# Patient Record
Sex: FEMALE | Race: WHITE | NOT HISPANIC OR LATINO | Employment: STUDENT | URBAN - METROPOLITAN AREA
[De-identification: names, ages, dates, MRNs, and addresses within clinical notes are randomized per-mention and may not be internally consistent; named-entity substitution may affect disease eponyms.]

---

## 2021-11-01 ENCOUNTER — OFFICE VISIT (OUTPATIENT)
Dept: FAMILY MEDICINE CLINIC | Facility: CLINIC | Age: 21
End: 2021-11-01

## 2021-11-01 VITALS
WEIGHT: 189.8 LBS | DIASTOLIC BLOOD PRESSURE: 64 MMHG | SYSTOLIC BLOOD PRESSURE: 122 MMHG | TEMPERATURE: 98 F | BODY MASS INDEX: 34.93 KG/M2 | OXYGEN SATURATION: 99 % | HEART RATE: 92 BPM | HEIGHT: 62 IN

## 2021-11-01 DIAGNOSIS — Z00.00 ENCOUNTER FOR WELLNESS EXAMINATION IN ADULT: Primary | ICD-10-CM

## 2021-11-01 PROBLEM — L50.3 DERMATOGRAPHIA: Status: ACTIVE | Noted: 2021-11-01

## 2021-11-01 PROBLEM — D89.40 MAST CELL ACTIVATION SYNDROME (HCC): Status: ACTIVE | Noted: 2021-11-01

## 2021-11-01 PROCEDURE — 99385 PREV VISIT NEW AGE 18-39: CPT | Performed by: FAMILY MEDICINE

## 2021-11-01 NOTE — PROGRESS NOTES
Subjective   Bandar Jules is a 20 y.o. female who presents for annual female wellness exam.  Chief Complaint   Patient presents with   • Establish Care   • Annual Exam   • Gynecologic Exam        Menstrual History: LNMP 10/6/21, regular menses  Pregnancy History: G0  Sexual History: Never sexually active  Contraception: Abstinence  Diet: balanced  Exercise: no routine exercise  Do you feel safe? yes  Have you ever been abused? no    Mammogram: NA  Pap Smear: never, abstinence  Bone Density: NA  Colon Cancer Screening: NA      There is no immunization history on file for this patient.    The following portions of the patient's history were reviewed and updated as appropriate: allergies, current medications, past family history, past medical history, past social history, past surgical history and problem list.    History reviewed. No pertinent past medical history.    Past Surgical History:   Procedure Laterality Date   • EAR TUBES  2003       Family History   Problem Relation Age of Onset   • Basal cell carcinoma Father    • Breast cancer Maternal Grandmother    • Lung cancer Paternal Grandfather        Social History     Socioeconomic History   • Marital status: Single   • Number of children: 0   Tobacco Use   • Smoking status: Never Smoker   • Smokeless tobacco: Never Used   Vaping Use   • Vaping Use: Never used   Substance and Sexual Activity   • Alcohol use: Yes     Alcohol/week: 1.0 standard drink     Types: 1 Standard drinks or equivalent per week   • Drug use: Never   • Sexual activity: Never       Review of Systems   Constitutional: Negative for activity change, appetite change, fatigue and unexpected weight change.   HENT: Negative for congestion, ear pain, nosebleeds, sore throat and tinnitus.    Eyes: Negative for pain, redness and visual disturbance.   Respiratory: Negative for cough, shortness of breath and wheezing.    Cardiovascular: Negative for chest pain, palpitations and leg swelling.    Gastrointestinal: Negative for abdominal pain, blood in stool and nausea.   Endocrine: Negative for polydipsia and polyuria.   Genitourinary: Negative for dysuria, frequency, menstrual problem and vaginal discharge.   Musculoskeletal: Negative for arthralgias, joint swelling and myalgias.   Skin: Negative for rash.   Allergic/Immunologic: Negative for environmental allergies, food allergies and immunocompromised state.   Neurological: Negative for dizziness, speech difficulty, weakness and headaches.   Hematological: Negative for adenopathy. Does not bruise/bleed easily.   Psychiatric/Behavioral: Negative for decreased concentration and dysphoric mood. The patient is not nervous/anxious.        Objective   Vitals:    11/01/21 0848   BP: 122/64   Pulse: 92   Temp: 98 °F (36.7 °C)   SpO2: 99%     Body mass index is 34.71 kg/m².  Physical Exam  Vitals and nursing note reviewed.   Constitutional:       Appearance: Normal appearance. She is well-developed. She is obese.   HENT:      Head: Normocephalic and atraumatic.   Eyes:      General: No scleral icterus.     Conjunctiva/sclera: Conjunctivae normal.   Cardiovascular:      Rate and Rhythm: Normal rate and regular rhythm.      Heart sounds: Normal heart sounds.   Pulmonary:      Effort: Pulmonary effort is normal.      Breath sounds: Normal breath sounds.   Abdominal:      General: Bowel sounds are normal.      Palpations: Abdomen is soft.   Musculoskeletal:         General: Normal range of motion.      Cervical back: Normal range of motion and neck supple.      Right lower leg: No edema.      Left lower leg: No edema.   Skin:     General: Skin is warm and dry.      Capillary Refill: Capillary refill takes less than 2 seconds.      Findings: No rash.   Neurological:      Mental Status: She is alert and oriented to person, place, and time.   Psychiatric:         Mood and Affect: Mood normal.         Behavior: Behavior normal.         Thought Content: Thought content  normal.         Judgment: Judgment normal.           Assessment/Plan   Diagnoses and all orders for this visit:    1. Encounter for wellness examination in adult (Primary)    Since the patient is not sexually active at this time we can defer Pap smear today and plan on performing it next year.  Discussed the importance of maintaining a healthy weight and getting regular exercise.  Educated patient on the benefits of healthy diet.  Advise follow-up annually for wellness exams.      There are no Patient Instructions on file for this visit.

## 2021-11-05 ENCOUNTER — PATIENT ROUNDING (BHMG ONLY) (OUTPATIENT)
Dept: FAMILY MEDICINE CLINIC | Facility: CLINIC | Age: 21
End: 2021-11-05

## 2021-11-05 NOTE — PROGRESS NOTES
November 5, 2021    Hello, may I speak with Bandar Jules?    My name is PRINCE BAUTISTA    I am  with [facility]  [address]  [facility] [address] [phone].    Before we get started may I verify your date of birth? 2000    I am calling to officially welcome you to our practice and ask about your recent visit. Is this a good time to talk? yes    Tell me about your visit with us. What things went well?  ALL WAS GOOD       We're always looking for ways to make our patients' experiences even better. Do you have recommendations on ways we may improve?  no    Overall were you satisfied with your first visit to our practice? yes       I appreciate you taking the time to speak with me today. Is there anything else I can do for you? no      Thank you, and have a great day.

## 2022-11-28 ENCOUNTER — OFFICE VISIT (OUTPATIENT)
Dept: OBSTETRICS AND GYNECOLOGY | Facility: CLINIC | Age: 22
End: 2022-11-28

## 2022-11-28 VITALS
DIASTOLIC BLOOD PRESSURE: 70 MMHG | BODY MASS INDEX: 35.92 KG/M2 | WEIGHT: 195.2 LBS | SYSTOLIC BLOOD PRESSURE: 112 MMHG | HEIGHT: 62 IN

## 2022-11-28 DIAGNOSIS — N90.89 SKIN TAG OF LABIA: Primary | ICD-10-CM

## 2022-11-28 DIAGNOSIS — Z80.3 FAMILY HISTORY OF BREAST CANCER: ICD-10-CM

## 2022-11-28 PROCEDURE — 11200 RMVL SKIN TAGS UP TO&INC 15: CPT | Performed by: OBSTETRICS & GYNECOLOGY

## 2022-11-28 PROCEDURE — 99202 OFFICE O/P NEW SF 15 MIN: CPT | Performed by: OBSTETRICS & GYNECOLOGY

## 2022-11-28 RX ORDER — ISOTRETINOIN 20 MG/1
CAPSULE ORAL
COMMUNITY
Start: 2022-06-20

## 2022-11-28 RX ORDER — LORATADINE 10 MG/1
10 TABLET ORAL DAILY
COMMUNITY

## 2022-11-28 NOTE — PROGRESS NOTES
Chief Complaint   Patient presents with   • Establish Care   • Skin Problem     Right vulvar skin tags          Subjective   HPI  Bandar Jules is a 21 y.o. female, No obstetric history on file., who presents for evaluation of right vulvar skin tags that is recurrent.      She states she has experienced this problem for 4-5 years. She first discovered the right vulvar skin tags 4-5 years ago. Per patient the pathology was benign. Since hen she reports multiple right vulvar skin tags have returned about 1-2 years ago being when she first noticed them. She denies any pain or irration associated with them.  The patient reports additional symptoms as none.  The patient does not have a history of a bartholin's cyst.     Patient's last menstrual period was 11/18/2022 (exact date)..  Partner Status: Marital Status: single and has never been sexually active.    She has significant family history of breast and ovarian cancer and I recommend genetics referral.     Additional OB/GYN History   Last Pap : never  Last Completed Pap Smear     This patient has no relevant Health Maintenance data.          Tobacco Usage?: No       Current Outpatient Medications:   •  fluticasone (FLONASE) 50 MCG/ACT nasal spray, into the nostril(s) as directed by provider., Disp: , Rfl:   •  ISOtretinoin (ACCUTANE) 20 MG capsule, , Disp: , Rfl:   •  loratadine (Allergy) 10 MG tablet, Take 10 mg by mouth Daily., Disp: , Rfl:   •  Cetirizine HCl 10 MG capsule, Take  by mouth., Disp: , Rfl:      Past Medical History:   Diagnosis Date   • Mast cell activation syndrome (HCC)    • Sensitive skin         Past Surgical History:   Procedure Laterality Date   • EAR TUBES  2003   • WISDOM TOOTH EXTRACTION  2016?       The additional following portions of the patient's history were reviewed and updated as appropriate: allergies, current medications, past family history, past medical history, past social history, past surgical history and problem  "list.    Review of Systems   Constitutional: Negative.    Respiratory: Negative.    Cardiovascular: Negative.    Gastrointestinal: Negative.    Genitourinary: Negative for breast discharge, breast lump, breast pain, menstrual problem and pelvic pain.   Musculoskeletal: Negative.    Neurological: Negative.    Psychiatric/Behavioral: Negative.        I have reviewed and agree with the HPI, ROS, and historical information as entered above. Armand Navarrete MD    Objective   /70   Ht 157.5 cm (62\")   Wt 88.5 kg (195 lb 3.2 oz)   LMP 11/18/2022 (Exact Date)   BMI 35.70 kg/m²     Physical Exam  Vitals reviewed. Exam conducted with a chaperone present.   Constitutional:       Appearance: Normal appearance. She is normal weight.   HENT:      Head: Normocephalic and atraumatic.   Genitourinary:         Comments: Chain of multiple skin tags on the right labia majora  Skin:     General: Skin is warm and dry.   Neurological:      Mental Status: She is alert and oriented to person, place, and time.   Psychiatric:         Mood and Affect: Mood normal.         Behavior: Behavior normal.          Vulvar Biopsy Procedure Note           Indications:The patient is a 21 y.o. No obstetric history on file., who presents today for vulvar biopsy due to multiple skin tags on the right labia.   After being presented with the risk, benefits, and specific detail of the procedure, the patient wished to proceed.  Verbal consent was obtained from patient.       Procedure Details   The patient was placed in the dorsal lithotomy position and the area was prepped with betadine.   The area was injected with 1 mL of lidocaine with epinephrine using a 25 gauge needle.  After adequate anesthesia was reached, the skin tags were elevated with pick ups and skip biopsy performed.    The specimens were placed in formalin and sent to pathology for evaluation.  Pressure was applied to the biopsy site to control bleeding and silver nitrate was " applied.  Hemostasis was adequate.       There was minimal bleeding.  The patient tolerated the procedure well.     Complications: none.        Armand Navarrete MD  11/28/2022      Assessment & Plan     Assessment         Problem List Items Addressed This Visit        Genitourinary and Reproductive     Skin tag of labia - Primary    Relevant Medications    ISOtretinoin (ACCUTANE) 20 MG capsule    Other Relevant Orders    TISSUE EXAM, [facility] ([facility])   Other Visit Diagnoses     Family history of breast cancer        Relevant Orders    Ambulatory Referral to Genetic Counseling/Testing            Plan     1. Vulvar lesions removed without complications. Instructions given.   2. Will refer to genetics team for family history of breast and ovarian cancer.       Armand Navarrete MD  11/28/2022

## 2022-11-29 LAB — REF LAB TEST METHOD: NORMAL

## 2022-12-02 ENCOUNTER — PATIENT ROUNDING (BHMG ONLY) (OUTPATIENT)
Dept: OBSTETRICS AND GYNECOLOGY | Facility: CLINIC | Age: 22
End: 2022-12-02

## 2022-12-02 NOTE — PROGRESS NOTES
December 2, 2022    Hello, may I speak with Bandar Jules?    My name is Mariah    I am  with [facility]  [facility]  [address]  [place] [address] [phone].    Before we get started may I verify your date of birth? 2000    I am calling to officially welcome you to our practice and ask about your recent visit. Is this a good time to talk? No - left voicemail     Tell me about your visit with us. What things went well?  na     We're always looking for ways to make our patients' experiences even better. Do you have recommendations on ways we may improve?  na    Overall were you satisfied with your first visit to our practice? Na     I appreciate you taking the time to speak with me today. Is there anything else I can do for you? na      Thank you, and have a great day.

## 2022-12-02 NOTE — PROGRESS NOTES
December 2, 2022    Hello, may I speak with Bandar Jules?    My name is Mariah    I am  with [facility]  [facility]  [address]  [place] [address] [phone].    Before we get started may I verify your date of birth? 2000    I am calling to officially welcome you to our practice and ask about your recent visit. Is this a good time to talk? No - left voicemail     Tell me about your visit with us. What things went well?  na     We're always looking for ways to make our patients' experiences even better. Do you have recommendations on ways we may improve?  na    Overall were you satisfied with your first visit to our practice? na     I appreciate you taking the time to speak with me today. Is there anything else I can do for you? na    Thank you, and have a great day.

## 2023-08-21 ENCOUNTER — OFFICE VISIT (OUTPATIENT)
Dept: FAMILY MEDICINE CLINIC | Facility: CLINIC | Age: 23
End: 2023-08-21
Payer: COMMERCIAL

## 2023-08-21 ENCOUNTER — CLINICAL SUPPORT (OUTPATIENT)
Dept: GENETICS | Facility: HOSPITAL | Age: 23
End: 2023-08-21
Payer: COMMERCIAL

## 2023-08-21 VITALS
OXYGEN SATURATION: 100 % | WEIGHT: 198.6 LBS | DIASTOLIC BLOOD PRESSURE: 78 MMHG | HEART RATE: 64 BPM | SYSTOLIC BLOOD PRESSURE: 111 MMHG | BODY MASS INDEX: 36.55 KG/M2 | HEIGHT: 62 IN

## 2023-08-21 DIAGNOSIS — Z30.09 ENCOUNTER FOR GENERAL COUNSELING ON PRESCRIPTION OF ORAL CONTRACEPTIVES: Primary | ICD-10-CM

## 2023-08-21 DIAGNOSIS — Z80.3 FAMILY HISTORY OF BREAST CANCER: ICD-10-CM

## 2023-08-21 DIAGNOSIS — Z13.79 GENETIC TESTING: Primary | ICD-10-CM

## 2023-08-21 DIAGNOSIS — Z80.41 FAMILY HISTORY OF OVARIAN CANCER: ICD-10-CM

## 2023-08-21 PROCEDURE — 96040: CPT | Performed by: GENETIC COUNSELOR, MS

## 2023-08-21 PROCEDURE — 99213 OFFICE O/P EST LOW 20 MIN: CPT | Performed by: FAMILY MEDICINE

## 2023-08-21 RX ORDER — DESOGESTREL AND ETHINYL ESTRADIOL 0.15-0.03
1 KIT ORAL DAILY
Qty: 84 TABLET | Refills: 1 | Status: SHIPPED | OUTPATIENT
Start: 2023-08-21 | End: 2024-08-20

## 2023-08-21 NOTE — PROGRESS NOTES
Bandar Jules, a 22-year-old female, was referred for genetic counseling due to a family history of breast cancer. Ms. Jules has no personal history of cancer. She was 9 years old at menarche and is nulliparous. She is pre-menopausal and retains her uterus and ovaries. Ms. Jules's current cancer screenings include annual clinical breast exams. She has not had a colonoscopy just yet. She was interested in discussing her risk for a hereditary cancer syndrome. Ms. Jules decided to pursue comprehensive genetic testing to evaluate her risk of cancer, therefore the CancerNext Expanded Panel was ordered through [employer] which analyzes 77 genes associated with an increased cancer risk. Results are expected in 2-3 weeks.     PERTINENT FAMILY HISTORY: (See attached pedigree)   Mat Aunt:  Ovarian cancer, 36     Reportedly positive genetic testing  Mat Grandmother: Breast cancer, 44  Mat GGrandmother: Breast cancer  Father:   Basal cell carcinoma  Pat Grandfather: Lung cancer, 67    We do not have medical records regarding any of these diagnoses.       RISK ASSESSMENT:  Ms. Jules's family history of breast cancer raises the question of a hereditary cancer syndrome. NCCN guidelines for genetic testing for BRCA1/2 states that individuals with a close relative with breast cancer under the age of 50 may consider genetic testing. With Ms. Jules's maternal grandmother's diagnosis being at age 44, she would clearly meet this criteria. This risk assessment is based on the family history information provided at the time of the appointment. The assessment could change in the future should new information be obtained.    GENETIC COUNSELING (30 minutes):  We reviewed the family history information in detail. Cases of cancer follow three general patterns: sporadic, familial, and hereditary. While most cancer is sporadic, some cases appear to occur in family clusters. These cases are said to be familial and account  for 10-20% of cancer cases. Familial cases may be due to a combination of shared genes and environmental factors among family members. In even fewer cases, the risk for cancer is inherited, and the genes responsible for the increased cancer risk are known.       Family histories typical of hereditary cancer syndromes usually include multiple first- and second-degree relatives diagnosed with cancer types that define a syndrome. These cases tend to be diagnosed at younger-than-expected ages and can be bilateral or multifocal. The cancer in these families follows an autosomal dominant inheritance pattern, which indicates the likely presence of a mutation in a cancer susceptibility gene. Children and siblings of an individual believed to carry this mutation have a 50% chance of inheriting that mutation, thereby inheriting the increased risk to develop cancer. These mutations can be passed down from the maternal or the paternal lineage.     Due to Ms. Jules's family history of breast and ovarian cancer, we discussed hereditary breast cancer. Hereditary breast cancer accounts for 5-10% of all cases of breast cancer. A significant proportion (50%) of hereditary breast cancer can be attributed to mutations in the BRCA1 and BRCA2 genes. Mutations in these genes confer an increased risk for breast cancer, ovarian cancer, male breast cancer, prostate cancer, and pancreatic cancer. Women with a BRCA1 or BRCA2 mutation have up to an 87% lifetime risk of breast cancer and up to a 58% risk of ovarian cancer. There are other clinically significant breast cancer related genes in addition to BRCA1/2, including PALB2, FRANCIS, and CHEK2.     There are other hereditary cancer syndromes as well. Based on Ms. Jules's family history and her desire to get more information regarding her personal risks, testing was pursued through a multigene panel evaluating several other genes known to increase the risk for cancer.    GENETIC TESTING:   The risks, benefits, and limitations of genetic testing and implications for clinical management following testing were reviewed. DNA test results can influence decisions regarding screening and prevention. Genetic testing can have significant psychological implications for both individuals and families. Also discussed was the possibility of employment and insurance discrimination based on genetic test results and the laws in place to prevent this, as well as the limitations of these laws.       We discussed panel testing, which would involve testing 77 genes associated with increased cancer risk. The implications of a positive or negative test result were discussed. We also discussed the importance of testing an affected relative and how a negative result for Ms. Jules wouldn't necessarily mean the cancers presenting in her family weren't due to a genetic mutation that Ms. Jules did not inherit. In general, a negative genetic test result is most informative if a mutation has first been established in an affected member of the family. In cases where an affected individual is not available or interested in testing, it is appropriate to offer testing to an unaffected individual. We discussed the possibility that, in some cases, genetic test results may be ambiguous due to the identification of a genetic variant. These variants may or may not be associated with an increased cancer risk. With multigene panel testing, it is not uncommon for a variant of uncertain significance (VUS) to be identified. If a VUS is identified, testing family members is typically not recommended and screening recommendations are made based on the family history. The laboratories that perform genetic testing work to reclassify the VUS and send out an amended report if and when a VUS is reclassified. The majority of variant findings are ultimately reclassified to a negative result. Given Ms. Jules's family history, a negative test  result does not eliminate all cancer risk, although the risk would not be as high as it would with positive genetic testing.     PLAN: Genetic testing was ordered via the CancerNext Expanded Panel through [employer]. Results are expected in 2-3 weeks and will be called out to Ms. Jules. If she has any questions in the meantime, she is welcome to call me at [phone].      Chaparrita Castro MS, [facility] [facility]  Licensed Certified Genetic Counselor

## 2023-08-21 NOTE — PROGRESS NOTES
"Chief Complaint  discuss birth control     Subjective        Bandar Jules presents to [facility]  History of Present Illness  Patient is here today for some options for hormone hormonal menstrual support.  Currently her wedding is scheduled for November 4, 2023 and it is expected that the first day of her menstrual cycle will be on that date.  She has never been on hormonal contraception in the past.  And she does not want to continue on hormonal contraception after her wedding.  She denies any family history or personal history of blood clotting disorders known to her.  Patient does not smoke.    Objective   Vital Signs:  /78   Pulse 64   Ht 157.5 cm (62\")   Wt 90.1 kg (198 lb 9.6 oz)   SpO2 100%   BMI 36.32 kg/mý   Estimated body mass index is 36.32 kg/mý as calculated from the following:    Height as of this encounter: 157.5 cm (62\").    Weight as of this encounter: 90.1 kg (198 lb 9.6 oz).             Physical Exam  Vitals and nursing note reviewed.   Constitutional:       Appearance: She is well-developed.   HENT:      Head: Normocephalic and atraumatic.   Eyes:      General: No scleral icterus.     Conjunctiva/sclera: Conjunctivae normal.   Cardiovascular:      Rate and Rhythm: Normal rate and regular rhythm.      Heart sounds: Normal heart sounds.   Pulmonary:      Effort: Pulmonary effort is normal.      Breath sounds: Normal breath sounds.   Musculoskeletal:      Cervical back: Normal range of motion and neck supple.   Skin:     General: Skin is warm and dry.   Neurological:      Mental Status: She is alert and oriented to person, place, and time.   Psychiatric:         Mood and Affect: Mood normal.         Behavior: Behavior normal.         Thought Content: Thought content normal.         Judgment: Judgment normal.      Result Review :  The following data was reviewed by: Galina Eng DO on 08/21/2023:                   Assessment and Plan   Diagnoses and " all orders for this visit:    1. Encounter for general counseling on prescription of oral contraceptives (Primary)  -     desogestrel-ethinyl estradiol (APRI) 0.15-30 MG-MCG per tablet; Take 1 tablet by mouth Daily.  Dispense: 84 tablet; Refill: 1    Patient is here today to start hormonal contraception in order to manipulate the dates of her next menstrual cycles.  Patient does not intend to continue oral contraceptive pills beyond 3 months.  Patient was advised of risks and side effects of hormonal contraception.  She was advised to contact me if she has any issues.  She was instructed on how to take birth control pills continuously to avoid menstrual period.  Patient will follow-up with me for a physical later this year.         Follow Up   Return in about 3 months (around 11/15/2023) for contraception, Annual physical.  Patient was given instructions and counseling regarding her condition or for health maintenance advice. Please see specific information pulled into the AVS if appropriate.       Answers submitted by the patient for this visit:  Other (Submitted on 8/15/2023)  Please describe your symptoms.: Birth control options  Have you had these symptoms before?: No  How long have you been having these symptoms?: 1-4 days  Please list any medications you are currently taking for this condition.: Zyrtec and flonase  Please describe any probable cause for these symptoms. : Dont want to be on period the day of my wedding  Primary Reason for Visit (Submitted on 8/15/2023)  What is the primary reason for your visit?: Other

## 2023-09-01 ENCOUNTER — TELEPHONE (OUTPATIENT)
Dept: GENETICS | Facility: HOSPITAL | Age: 23
End: 2023-09-01
Payer: COMMERCIAL

## 2023-09-01 NOTE — TELEPHONE ENCOUNTER
Pt returned my call and I disclosed her negative genetic results. Informed patient these results would be on [employer] and sent to her Dr. Patient requested a copy be mailed to her.

## 2023-09-05 ENCOUNTER — DOCUMENTATION (OUTPATIENT)
Dept: GENETICS | Facility: HOSPITAL | Age: 23
End: 2023-09-05
Payer: COMMERCIAL

## 2023-09-05 NOTE — PROGRESS NOTES
Bandar Jules, a 22-year-old female, was referred for genetic counseling due to a family history of breast cancer. Ms. Jules has no personal history of cancer. She was 9 years old at menarche and is nulliparous. She is pre-menopausal and retains her uterus and ovaries. Ms. Jules's current cancer screenings include annual clinical breast exams. She has not had a colonoscopy just yet. She was interested in discussing her risk for a hereditary cancer syndrome. Ms. Jules decided to pursue comprehensive genetic testing to evaluate her risk of cancer, therefore the CancerNext Expanded Panel was ordered through [employer] which analyzes 77 genes associated with an increased cancer risk. Genetic testing was negative for pathogenic mutations in BRCA1/2 and 75 additional genes included on this panel (see attached results). These normal results were discussed with Ms. Jules by telephone on 9/1/23.     PERTINENT FAMILY HISTORY: (See attached pedigree)   Mat Aunt:  Ovarian cancer, 36     Reportedly positive genetic testing  Mat Grandmother: Breast cancer, 44  Mat GGrandmother: Breast cancer  Father:   Basal cell carcinoma  Pat Grandfather: Lung cancer, 67    We do not have medical records regarding any of these diagnoses.       RISK ASSESSMENT:  Ms. Jules's family history of breast cancer raises the question of a hereditary cancer syndrome. NCCN guidelines for genetic testing for BRCA1/2 states that individuals with a close relative with breast cancer under the age of 50 may consider genetic testing. With Ms. Jules's maternal grandmother's diagnosis being at age 44, she would clearly meet this criteria. This risk assessment is based on the family history information provided at the time of the appointment. The assessment could change in the future should new information be obtained.    At this time, Ms. Jules's management should be guided by a family history-based risk assessment. Jamar  version 8 is able to take into account personal factors (age at menarche, age at first live birth, breast density, etc) and family history when calculating risk for breast cancer. Computer modeling estimates that Ms. Jules's lifetime personal risk for developing breast cancer is up to 19.2% (Jamar, v8), compared to the average female's risk of 12.5%. In general, a lifetime risk above 20% is considered to be “high risk” where increased screening is warranted; Ms. Patino risk does not fall into that category. This risk assessment is based on the family history information provided at the time of the appointment and could change in the future should new information be obtained.    GENETIC COUNSELING (30 minutes):  We reviewed the family history information in detail. Cases of cancer follow three general patterns: sporadic, familial, and hereditary. While most cancer is sporadic, some cases appear to occur in family clusters. These cases are said to be familial and account for 10-20% of cancer cases. Familial cases may be due to a combination of shared genes and environmental factors among family members. In even fewer cases, the risk for cancer is inherited, and the genes responsible for the increased cancer risk are known.       Family histories typical of hereditary cancer syndromes usually include multiple first- and second-degree relatives diagnosed with cancer types that define a syndrome. These cases tend to be diagnosed at younger-than-expected ages and can be bilateral or multifocal. The cancer in these families follows an autosomal dominant inheritance pattern, which indicates the likely presence of a mutation in a cancer susceptibility gene. Children and siblings of an individual believed to carry this mutation have a 50% chance of inheriting that mutation, thereby inheriting the increased risk to develop cancer. These mutations can be passed down from the maternal or the paternal lineage.      Due to Ms. Jules's family history of breast and ovarian cancer, we discussed hereditary breast cancer. Hereditary breast cancer accounts for 5-10% of all cases of breast cancer. A significant proportion (50%) of hereditary breast cancer can be attributed to mutations in the BRCA1 and BRCA2 genes. Mutations in these genes confer an increased risk for breast cancer, ovarian cancer, male breast cancer, prostate cancer, and pancreatic cancer. Women with a BRCA1 or BRCA2 mutation have up to an 87% lifetime risk of breast cancer and up to a 58% risk of ovarian cancer. There are other clinically significant breast cancer related genes in addition to BRCA1/2, including PALB2, FRANCIS, and CHEK2.     There are other hereditary cancer syndromes as well. Based on Ms. Jules's family history and her desire to get more information regarding her personal risks, testing was pursued through a multigene panel evaluating several other genes known to increase the risk for cancer.    GENETIC TESTING:  The risks, benefits, and limitations of genetic testing and implications for clinical management following testing were reviewed. DNA test results can influence decisions regarding screening and prevention. Genetic testing can have significant psychological implications for both individuals and families. Also discussed was the possibility of employment and insurance discrimination based on genetic test results and the laws in place to prevent this, as well as the limitations of these laws.       We discussed panel testing, which would involve testing 77 genes associated with increased cancer risk. The implications of a positive or negative test result were discussed. We also discussed the importance of testing an affected relative and how a negative result for Ms. Jules wouldn't necessarily mean the cancers presenting in her family weren't due to a genetic mutation that Ms. Jules did not inherit. In general, a negative genetic  test result is most informative if a mutation has first been established in an affected member of the family. In cases where an affected individual is not available or interested in testing, it is appropriate to offer testing to an unaffected individual. We discussed the possibility that, in some cases, genetic test results may be ambiguous due to the identification of a genetic variant. These variants may or may not be associated with an increased cancer risk. With multigene panel testing, it is not uncommon for a variant of uncertain significance (VUS) to be identified. If a VUS is identified, testing family members is typically not recommended and screening recommendations are made based on the family history. The laboratories that perform genetic testing work to reclassify the VUS and send out an amended report if and when a VUS is reclassified. The majority of variant findings are ultimately reclassified to a negative result. Given Ms. Jules's family history, a negative test result does not eliminate all cancer risk, although the risk would not be as high as it would with positive genetic testing.     TEST RESULTS:  Genetic testing was negative for pathogenic mutations by sequencing, rearrangement testing, and RNA analysis for the 75 genes including BRCA1/2 on the CancerNext Expanded panel. The negative result greatly lowers the risk of a hereditary cancer syndrome for Ms. Jules. It is possible that the family history is due to a hereditary cancer syndrome which Ms. Jules did not happen to inherit. This assessment is based on the information provided at the time of the consultation.    CANCER PREVENTION:  Options available to individuals with an elevated lifetime risk for breast and/or ovarian cancer were briefly discussed. Based on computer modeling, Ms. Venturas lifetime risk for breast cancer would not be considered “high risk” (>20%). Per NCCN guidelines, it is appropriate for her to follow  general population screening guidelines for her breast cancer risk including annual clinical breast exam and annual mammography. These assessments are based on the information provided at the time of consultation.    PLAN: Genetic counseling remains available to Ms. Jules and her family. If she has any questions in the future, she is welcome to call me at [phone].      Chaparrita Castro MS, [facility], [facility]  Licensed Certified Genetic Counselor      Cc: Bandar Navarrete MD

## 2024-02-23 ENCOUNTER — OFFICE VISIT (OUTPATIENT)
Dept: INTERNAL MEDICINE | Facility: CLINIC | Age: 24
End: 2024-02-23
Payer: COMMERCIAL

## 2024-02-23 VITALS
SYSTOLIC BLOOD PRESSURE: 132 MMHG | HEIGHT: 62 IN | HEART RATE: 98 BPM | DIASTOLIC BLOOD PRESSURE: 86 MMHG | WEIGHT: 197 LBS | OXYGEN SATURATION: 100 % | BODY MASS INDEX: 36.25 KG/M2 | TEMPERATURE: 98.6 F

## 2024-02-23 DIAGNOSIS — Z00.00 ANNUAL PHYSICAL EXAM: Primary | ICD-10-CM

## 2024-02-23 DIAGNOSIS — Z12.4 CERVICAL CANCER SCREENING: ICD-10-CM

## 2024-02-23 PROBLEM — D89.40 MAST CELL ACTIVATION SYNDROME: Status: RESOLVED | Noted: 2021-11-01 | Resolved: 2024-02-23

## 2024-02-23 RX ORDER — CETIRIZINE HYDROCHLORIDE 10 MG/1
CAPSULE, LIQUID FILLED ORAL
COMMUNITY
Start: 2016-07-16

## 2024-02-23 NOTE — PROGRESS NOTES
Subjective   Bandar Jules is a 23 y.o. female.     History of Present Illness  Patient presents to [facility] for annual exam and Pap smear  Vaccines up-to-date except for flu and COVID  Pap smear never done  Urine gonorrhea chlamydia screening due  Patient has monogamously sexually active with a male partner  Patient has no complaints today      The following portions of the patient's history were reviewed and updated as appropriate: allergies, current medications, past family history, past medical history, past social history, past surgical history, and problem list.    Review of Systems   All other systems reviewed and are negative.      Objective   Physical Exam  Vitals and nursing note reviewed. Exam conducted with a chaperone present.   Constitutional:       Appearance: Normal appearance.   HENT:      Head: Normocephalic and atraumatic.      Right Ear: External ear normal.      Left Ear: External ear normal.      Nose: Nose normal.      Mouth/Throat:      Mouth: Mucous membranes are moist.      Pharynx: Oropharynx is clear. No oropharyngeal exudate or posterior oropharyngeal erythema.   Eyes:      Extraocular Movements: Extraocular movements intact.      Conjunctiva/sclera: Conjunctivae normal.      Pupils: Pupils are equal, round, and reactive to light.   Cardiovascular:      Rate and Rhythm: Normal rate and regular rhythm.      Pulses: Normal pulses.      Heart sounds: Normal heart sounds.   Pulmonary:      Effort: Pulmonary effort is normal.      Breath sounds: Normal breath sounds.   Abdominal:      General: Abdomen is flat. Bowel sounds are normal.      Palpations: Abdomen is soft.      Hernia: There is no hernia in the left inguinal area or right inguinal area.   Genitourinary:     General: Normal vulva.      Pubic Area: No rash or pubic lice.       Labia:         Right: No rash, tenderness, lesion or injury.         Left: No rash, tenderness, lesion or injury.       Urethra: No prolapse,  urethral pain, urethral swelling or urethral lesion.      Vagina: Normal.      Cervix: Normal.      Uterus: Normal.       Adnexa: Right adnexa normal and left adnexa normal.      Rectum: Normal.   Musculoskeletal:         General: Normal range of motion.      Cervical back: Normal range of motion.   Lymphadenopathy:      Lower Body: No right inguinal adenopathy. No left inguinal adenopathy.   Skin:     General: Skin is warm.      Capillary Refill: Capillary refill takes less than 2 seconds.   Neurological:      General: No focal deficit present.      Mental Status: She is alert and oriented to person, place, and time. Mental status is at baseline.   Psychiatric:         Mood and Affect: Mood normal.         Behavior: Behavior normal.         Thought Content: Thought content normal.         Judgment: Judgment normal.         Assessment & Plan   Diagnoses and all orders for this visit:    1. Annual physical exam (Primary)  -     Chlamydia trachomatis, Neisseria gonorrhoeae, PCR - Urine, Urine, Clean Catch  -     Comprehensive Metabolic Panel  -     CBC (No Diff)  -     Hemoglobin A1c  -     Lipid Panel  -     Magnesium  -     TSH  -     Vitamin B12  -     Vitamin D,25-Hydroxy  -     T3, Free  -     T4, Free    2. Cervical cancer screening  -     LIQUID-BASED PAP SMEAR WITH HPV GENOTYPING IF ASCUS ([facility]AD); Future       Pap smear performed today  Class 2 Severe Obesity (BMI >=35 and <=39.9). Obesity-related health conditions include the following: none. Obesity is unchanged. BMI is is above average; BMI management plan is completed. We discussed portion control and increasing exercise  Counseled on heart healthy diet  Counseled on exercise 30 minutes a day 5 days a week  Follow with optometry once a year  Follow with dentistry twice a year  Brush and floss twice a day  Wear seatbelt while in a car .

## 2024-02-27 LAB
C TRACH RRNA SPEC QL NAA+PROBE: NEGATIVE
N GONORRHOEA RRNA SPEC QL NAA+PROBE: NEGATIVE

## 2024-02-29 LAB — REF LAB TEST METHOD: NORMAL

## 2024-03-02 LAB
25(OH)D3+25(OH)D2 SERPL-MCNC: 25.6 NG/ML (ref 30–100)
ALBUMIN SERPL-MCNC: 4.6 G/DL (ref 3.5–5.2)
ALBUMIN/GLOB SERPL: 2.1 G/DL
ALP SERPL-CCNC: 80 U/L (ref 39–117)
ALT SERPL-CCNC: 17 U/L (ref 1–33)
AST SERPL-CCNC: 16 U/L (ref 1–32)
BILIRUB SERPL-MCNC: 0.3 MG/DL (ref 0–1.2)
BUN SERPL-MCNC: 7 MG/DL (ref 6–20)
BUN/CREAT SERPL: 12.1 (ref 7–25)
CALCIUM SERPL-MCNC: 9.4 MG/DL (ref 8.6–10.5)
CHLORIDE SERPL-SCNC: 106 MMOL/L (ref 98–107)
CHOLEST SERPL-MCNC: 151 MG/DL (ref 0–200)
CO2 SERPL-SCNC: 22.4 MMOL/L (ref 22–29)
CREAT SERPL-MCNC: 0.58 MG/DL (ref 0.57–1)
EGFRCR SERPLBLD CKD-EPI 2021: 129.8 ML/MIN/1.73
ERYTHROCYTE [DISTWIDTH] IN BLOOD BY AUTOMATED COUNT: 13.5 % (ref 12.3–15.4)
GLOBULIN SER CALC-MCNC: 2.2 GM/DL
GLUCOSE SERPL-MCNC: 91 MG/DL (ref 65–99)
HBA1C MFR BLD: 5.7 % (ref 4.8–5.6)
HCT VFR BLD AUTO: 38.9 % (ref 34–46.6)
HDLC SERPL-MCNC: 34 MG/DL (ref 40–60)
HGB BLD-MCNC: 12.7 G/DL (ref 12–15.9)
LDLC SERPL CALC-MCNC: 96 MG/DL (ref 0–100)
MAGNESIUM SERPL-MCNC: 2.1 MG/DL (ref 1.6–2.6)
MCH RBC QN AUTO: 28.6 PG (ref 26.6–33)
MCHC RBC AUTO-ENTMCNC: 32.6 G/DL (ref 31.5–35.7)
MCV RBC AUTO: 87.6 FL (ref 79–97)
PLATELET # BLD AUTO: 316 10*3/MM3 (ref 140–450)
POTASSIUM SERPL-SCNC: 4.2 MMOL/L (ref 3.5–5.2)
PROT SERPL-MCNC: 6.8 G/DL (ref 6–8.5)
RBC # BLD AUTO: 4.44 10*6/MM3 (ref 3.77–5.28)
SODIUM SERPL-SCNC: 143 MMOL/L (ref 136–145)
T3FREE SERPL-MCNC: 3.8 PG/ML (ref 2–4.4)
T4 FREE SERPL-MCNC: 0.93 NG/DL (ref 0.93–1.7)
TRIGL SERPL-MCNC: 115 MG/DL (ref 0–150)
TSH SERPL DL<=0.005 MIU/L-ACNC: 1.37 UIU/ML (ref 0.27–4.2)
VIT B12 SERPL-MCNC: 518 PG/ML (ref 211–946)
VLDLC SERPL CALC-MCNC: 21 MG/DL (ref 5–40)
WBC # BLD AUTO: 8.56 10*3/MM3 (ref 3.4–10.8)

## 2024-03-04 ENCOUNTER — PATIENT MESSAGE (OUTPATIENT)
Dept: INTERNAL MEDICINE | Facility: CLINIC | Age: 24
End: 2024-03-04
Payer: COMMERCIAL

## 2024-07-02 ENCOUNTER — TELEPHONE (OUTPATIENT)
Dept: INTERNAL MEDICINE | Facility: CLINIC | Age: 24
End: 2024-07-02

## 2024-07-02 NOTE — TELEPHONE ENCOUNTER
Caller: Bandar Jules    Relationship: Self    Best call back number: [phone]    What orders are you requesting (i.e. lab or imaging):     LABS TO CONFIRM PREGNANCY    Additional notes:     PLEASE CALL PATIENT WHEN ORDERS HAVE BEEN PUT IN

## 2024-07-15 ENCOUNTER — TELEPHONE (OUTPATIENT)
Dept: OBSTETRICS AND GYNECOLOGY | Facility: CLINIC | Age: 24
End: 2024-07-15
Payer: COMMERCIAL

## 2024-07-15 DIAGNOSIS — O36.80X0 PREGNANCY WITH UNCERTAIN FETAL VIABILITY, SINGLE OR UNSPECIFIED FETUS: Primary | ICD-10-CM

## 2024-07-15 NOTE — TELEPHONE ENCOUNTER
Patient is scheduled for new OB appointment in August and is requesting HCG levels to confirm before appointment, order pended. thanks.

## 2024-08-16 ENCOUNTER — INITIAL PRENATAL (OUTPATIENT)
Dept: OBSTETRICS AND GYNECOLOGY | Facility: CLINIC | Age: 24
End: 2024-08-16
Payer: COMMERCIAL

## 2024-08-16 VITALS — DIASTOLIC BLOOD PRESSURE: 70 MMHG | BODY MASS INDEX: 36.57 KG/M2 | SYSTOLIC BLOOD PRESSURE: 112 MMHG | WEIGHT: 200 LBS

## 2024-08-16 DIAGNOSIS — Z34.01 ENCOUNTER FOR SUPERVISION OF NORMAL FIRST PREGNANCY IN FIRST TRIMESTER: Primary | ICD-10-CM

## 2024-08-16 NOTE — PROGRESS NOTES
Subjective     Chief Complaint   Patient presents with    Initial Prenatal Visit     New OB 11w 6d, ALEJANDRA 3/1/25       Bandar Jules is a 23 y.o. .  No LMP recorded. Patient is pregnant..  She presents to be seen to initiate prenatal care with our practice. She had nausea but it has improved. She has noticed some increase in heart rate but no chest pain or dyspnea associated with it. It is usually for brief periods.    The following portions of the patient's history were reviewed and updated as appropriate:vital signs, allergies, current medications, past family history, past medical history, past social history, past surgical history, and problem list.    Review of Systems -   /70   Wt 90.7 kg (200 lb)   BMI 36.57 kg/m²   Gastrointestinal: Nausea and vomiting, denies constipation   Genitourinary: denies frequency, urgency, or burning with urination  All other systems were reviewed and are negative    Objective     Physical Exam  Constitutional   The patient is awake, alert, well developed, well nourished and well groomed.   EENT  The neck is supple and the trachea is midline. Thyroid without nodules  Respiratory  The patient is relaxed and breathes without effort.   Lungs CTAB  Cardiovascular  Heart normal rate and rhythm is regular without murmur    Gastrointestinal   The abdomen is soft and non tender.  No hepatosplenomegaly.  Skin  Normal color. No rashes or lesions  Musculoskeletal  Full ROM. Normal gait  Neuro  Speech is fluent and words are clear. Normal, appropriate behavior   Psychiatric :  Oriented to person, place, and time. Thought processes are coherent, insight is good.     Imaging   Pelvic ultrasound report  11w6d, + FHT  US Ob < 14 Weeks Single or First Gestation (2024 10:11)     Assessment & Plan     ASSESSMENT  IUP at 11w6d   Normal pregnancy  3.   Discomforts of pregnancy.    PLAN  Tests ordered today:  Orders Placed This Encounter   Procedures    Chlamydia trachomatis,  Neisseria gonorrhoeae, PCR w/ confirmation - , Urine, Clean Catch     Order Specific Question:   Release to patient     Answer:   Routine Release [[id number]]    OB Panel With HIV and RPR     Order Specific Question:   Release to patient     Answer:   Routine Release [[id number]]    [id number] PLUS Core+SCA - Blood,     Order Specific Question:   [facility] Gestational age calculation method:     Answer:   ALEJANDRA,EDC     Order Specific Question:   Release to patient     Answer:   Routine Release [[id number]]    Urine Drug Screen - Urine, Clean Catch     Order Specific Question:   Release to patient     Answer:   Routine Release [[id number]]     Medications prescribed today:  No orders of the defined types were placed in this encounter.    Information reviewed: exercise in pregnancy, nutrition in pregnancy, weight gain in pregnancy, work and travel restrictions during pregnancy, list of OTC medications acceptable in pregnancy, and call coverage groups  The problem list for pregnancy was initiated today      Follow up: 4 week(s)         This note was electronically signed.    Marion Boateng CNM  8/16/2024

## 2024-08-21 LAB
ABO GROUP BLD: ABNORMAL
AMPHETAMINES UR QL SCN: NEGATIVE NG/ML
BARBITURATES UR QL SCN: NEGATIVE NG/ML
BASOPHILS # BLD AUTO: 0 X10E3/UL (ref 0–0.2)
BASOPHILS NFR BLD AUTO: 0 %
BENZODIAZ UR QL SCN: NEGATIVE NG/ML
BLD GP AB SCN SERPL QL: NEGATIVE
BZE UR QL SCN: NEGATIVE NG/ML
C TRACH RRNA SPEC QL NAA+PROBE: NEGATIVE
CANNABINOIDS UR QL SCN: NEGATIVE NG/ML
CFDNA.FET/CFDNA.TOTAL SFR FETUS: NORMAL %
CITATION REF LAB TEST: NORMAL
CREAT UR-MCNC: 21.2 MG/DL (ref 20–300)
EOSINOPHIL # BLD AUTO: 0.1 X10E3/UL (ref 0–0.4)
EOSINOPHIL NFR BLD AUTO: 1 %
ERYTHROCYTE [DISTWIDTH] IN BLOOD BY AUTOMATED COUNT: 14.1 % (ref 11.7–15.4)
FET 13+18+21+X+Y ANEUP PLAS.CFDNA: NEGATIVE
FET CHR 21 TS PLAS.CFDNA QL: NEGATIVE
FET MS X RISK WBC.DNA+CFDNA QL: NOT DETECTED
FET SEX PLAS.CFDNA DOSAGE CFDNA: NORMAL
FET TS 13 RISK PLAS.CFDNA QL: NEGATIVE
FET TS 18 RISK WBC.DNA+CFDNA QL: NEGATIVE
FET X + Y ANEUP RISK PLAS.CFDNA SEQ-IMP: NOT DETECTED
GA EST FROM CONCEPTION DATE: NORMAL D
GESTATIONAL AGE > 9:: YES
HBV SURFACE AG SERPL QL IA: NEGATIVE
HCT VFR BLD AUTO: 35.4 % (ref 34–46.6)
HCV AB SERPL QL IA: NORMAL
HCV IGG SERPL QL IA: NON REACTIVE
HGB BLD-MCNC: 11.3 G/DL (ref 11.1–15.9)
HIV 1+2 AB+HIV1 P24 AG SERPL QL IA: NON REACTIVE
IMM GRANULOCYTES # BLD AUTO: 0.1 X10E3/UL (ref 0–0.1)
IMM GRANULOCYTES NFR BLD AUTO: 1 %
LAB DIRECTOR NAME PROVIDER: NORMAL
LAB DIRECTOR NAME PROVIDER: NORMAL
LABORATORY COMMENT REPORT: NORMAL
LABORATORY COMMENT REPORT: NORMAL
LIMITATIONS OF THE TEST: NORMAL
LYMPHOCYTES # BLD AUTO: 2.1 X10E3/UL (ref 0.7–3.1)
LYMPHOCYTES NFR BLD AUTO: 19 %
MCH RBC QN AUTO: 29.7 PG (ref 26.6–33)
MCHC RBC AUTO-ENTMCNC: 31.9 G/DL (ref 31.5–35.7)
MCV RBC AUTO: 93 FL (ref 79–97)
METHADONE UR QL SCN: NEGATIVE NG/ML
MONOCYTES # BLD AUTO: 0.7 X10E3/UL (ref 0.1–0.9)
MONOCYTES NFR BLD AUTO: 6 %
N GONORRHOEA RRNA SPEC QL NAA+PROBE: NEGATIVE
NEGATIVE PREDICTIVE VALUE: NORMAL
NEUTROPHILS # BLD AUTO: 7.8 X10E3/UL (ref 1.4–7)
NEUTROPHILS NFR BLD AUTO: 73 %
NOTE: NORMAL
OPIATES UR QL SCN: NEGATIVE NG/ML
OXYCODONE+OXYMORPHONE UR QL SCN: NEGATIVE NG/ML
PCP UR QL: NEGATIVE NG/ML
PERFORMANCE CHARACTERISTICS: NORMAL
PH UR: 6.9 [PH] (ref 4.5–8.9)
PLATELET # BLD AUTO: 248 X10E3/UL (ref 150–450)
POSITIVE PREDICTIVE VALUE: NORMAL
PROPOXYPH UR QL SCN: NEGATIVE NG/ML
RBC # BLD AUTO: 3.8 X10E6/UL (ref 3.77–5.28)
REF LAB TEST METHOD: NORMAL
RH BLD: NEGATIVE
RPR SER QL: NON REACTIVE
RUBV IGG SERPL IA-ACNC: <0.9 INDEX
TEST PERFORMANCE INFO SPEC: NORMAL
WBC # BLD AUTO: 10.7 X10E3/UL (ref 3.4–10.8)

## 2024-09-13 ENCOUNTER — OFFICE VISIT (OUTPATIENT)
Dept: INTERNAL MEDICINE | Facility: CLINIC | Age: 24
End: 2024-09-13
Payer: COMMERCIAL

## 2024-09-13 ENCOUNTER — ROUTINE PRENATAL (OUTPATIENT)
Dept: OBSTETRICS AND GYNECOLOGY | Facility: CLINIC | Age: 24
End: 2024-09-13
Payer: COMMERCIAL

## 2024-09-13 VITALS
HEART RATE: 103 BPM | WEIGHT: 204 LBS | HEIGHT: 62 IN | RESPIRATION RATE: 16 BRPM | TEMPERATURE: 97.9 F | SYSTOLIC BLOOD PRESSURE: 110 MMHG | BODY MASS INDEX: 37.54 KG/M2 | OXYGEN SATURATION: 96 % | DIASTOLIC BLOOD PRESSURE: 68 MMHG

## 2024-09-13 VITALS — SYSTOLIC BLOOD PRESSURE: 120 MMHG | WEIGHT: 204.4 LBS | DIASTOLIC BLOOD PRESSURE: 66 MMHG | BODY MASS INDEX: 37.39 KG/M2

## 2024-09-13 DIAGNOSIS — H65.05 RECURRENT ACUTE SEROUS OTITIS MEDIA OF LEFT EAR: Primary | ICD-10-CM

## 2024-09-13 DIAGNOSIS — Z34.92 SECOND TRIMESTER PREGNANCY: Primary | ICD-10-CM

## 2024-09-13 PROCEDURE — 99213 OFFICE O/P EST LOW 20 MIN: CPT | Performed by: STUDENT IN AN ORGANIZED HEALTH CARE EDUCATION/TRAINING PROGRAM

## 2024-09-13 RX ORDER — AMOXICILLIN 500 MG/1
500 CAPSULE ORAL 3 TIMES DAILY
Qty: 21 CAPSULE | Refills: 0 | Status: SHIPPED | OUTPATIENT
Start: 2024-09-13

## 2024-09-13 NOTE — PROGRESS NOTES
Subjective   Bandar Jules is a 23 y.o. female.     History of Present Illness  Patient presents with 3 days of left ear pain.  States that seems to come and go has felt a little bit better this morning but was really bad yesterday.  States it feels like a lot of pressure on the ear.  States that this happens to her about once a year.      The following portions of the patient's history were reviewed and updated as appropriate: allergies, current medications, past family history, past medical history, past social history, past surgical history, and problem list.    Review of Systems   All other systems reviewed and are negative.      Objective   Physical Exam  Vitals and nursing note reviewed.   Constitutional:       Appearance: Normal appearance.   HENT:      Head: Normocephalic and atraumatic.      Right Ear: Tympanic membrane and external ear normal.      Left Ear: External ear normal. A middle ear effusion is present. Tympanic membrane is erythematous and bulging.      Nose: Nose normal.      Mouth/Throat:      Mouth: Mucous membranes are moist.      Pharynx: Oropharynx is clear. No oropharyngeal exudate or posterior oropharyngeal erythema.   Eyes:      Extraocular Movements: Extraocular movements intact.      Conjunctiva/sclera: Conjunctivae normal.      Pupils: Pupils are equal, round, and reactive to light.   Cardiovascular:      Rate and Rhythm: Normal rate and regular rhythm.      Pulses: Normal pulses.      Heart sounds: Normal heart sounds.   Pulmonary:      Effort: Pulmonary effort is normal.      Breath sounds: Normal breath sounds.   Abdominal:      General: Abdomen is flat. Bowel sounds are normal.      Palpations: Abdomen is soft.   Musculoskeletal:         General: Normal range of motion.      Cervical back: Normal range of motion.   Skin:     General: Skin is warm.      Capillary Refill: Capillary refill takes less than 2 seconds.   Neurological:      General: No focal deficit present.       Mental Status: She is alert and oriented to person, place, and time. Mental status is at baseline.   Psychiatric:         Mood and Affect: Mood normal.         Behavior: Behavior normal.         Thought Content: Thought content normal.         Judgment: Judgment normal.         Assessment & Plan   Diagnoses and all orders for this visit:    1. Recurrent acute serous otitis media of left ear (Primary)  -     amoxicillin (AMOXIL) 500 MG capsule; Take 1 capsule by mouth 3 (Three) Times a Day.  Dispense: 21 capsule; Refill: 0

## 2024-09-13 NOTE — PROGRESS NOTES
Chief Complaint   Patient presents with    Routine Prenatal Visit     Prenatal visit with no problems or concerns        HPI:   , 15w6d gestation reports doing well    ROS:  See Prenatal Episode/Flowsheet  /66   Wt 92.7 kg (204 lb 6.4 oz)   BMI 37.39 kg/m²      EXAM:  EXTREMITIES:  No swelling-See Prenatal Episode/Flowsheet    ABDOMEN:  FHTs/Movement noted-See Prenatal Episode/Flowsheet    URINE GLUCOSE/PROTEIN:  See Prenatal Episode/Flowsheet    PELVIC EXAM:  See Prenatal Episode/Flowsheet  CV:  Lungs:  GYN:    MDM:    Lab Results   Component Value Date    HGB 11.3 2024    RUBELLAABIGG <0.90 (L) 2024    HEPBSAG Negative 2024    ABO O 2024    RH Negative 2024    ABSCRN Negative 2024    SSR0CWN5 Non Reactive 2024    HEPCVIRUSABY Negative 2018       U/S:US Ob < 14 Weeks Single or First Gestation (2024 10:11)     1. IUP 15w6d  2. Routine care   3.  Normal labs and genetic testing.  4.  Anatomic ultrasound 3 to 4 weeks

## 2024-10-11 ENCOUNTER — ROUTINE PRENATAL (OUTPATIENT)
Dept: OBSTETRICS AND GYNECOLOGY | Facility: CLINIC | Age: 24
End: 2024-10-11
Payer: COMMERCIAL

## 2024-10-11 VITALS — DIASTOLIC BLOOD PRESSURE: 60 MMHG | BODY MASS INDEX: 37.31 KG/M2 | WEIGHT: 204 LBS | SYSTOLIC BLOOD PRESSURE: 108 MMHG

## 2024-10-11 DIAGNOSIS — Z34.92 SECOND TRIMESTER PREGNANCY: Primary | ICD-10-CM

## 2024-10-11 NOTE — PROGRESS NOTES
Chief Complaint   Patient presents with    Routine Prenatal Visit     Prenatal visit with Anatomy scan done today. No problems or concerns.        HPI:   , 19w6d gestation reports doing well    ROS:  See Prenatal Episode/Flowsheet  /60   Wt 92.5 kg (204 lb)   BMI 37.31 kg/m²      EXAM:  EXTREMITIES:  No swelling-See Prenatal Episode/Flowsheet    ABDOMEN:  FHTs/Movement noted-See Prenatal Episode/Flowsheet    URINE GLUCOSE/PROTEIN:  See Prenatal Episode/Flowsheet    PELVIC EXAM:  See Prenatal Episode/Flowsheet  CV:  Lungs:  GYN:    MDM:    Lab Results   Component Value Date    HGB 11.3 2024    RUBELLAABIGG <0.90 (L) 2024    HEPBSAG Negative 2024    ABO O 2024    RH Negative 2024    ABSCRN Negative 2024    [id number] Non Reactive 2024    HEPCVIRUSABY Negative 2018       U/S: Anatomic survey is within normal limits.  Size is consistent with dates.  Active male fetus in the vertex position.  Anterior placenta.  Three-vessel cord.    1. IUP 19w6d  2. Routine care   3.  Normal anatomy

## 2024-11-15 ENCOUNTER — ROUTINE PRENATAL (OUTPATIENT)
Dept: OBSTETRICS AND GYNECOLOGY | Facility: CLINIC | Age: 24
End: 2024-11-15
Payer: COMMERCIAL

## 2024-11-15 VITALS — DIASTOLIC BLOOD PRESSURE: 60 MMHG | SYSTOLIC BLOOD PRESSURE: 118 MMHG | WEIGHT: 207 LBS | BODY MASS INDEX: 37.86 KG/M2

## 2024-11-15 DIAGNOSIS — R10.2 SUPRAPUBIC PAIN: ICD-10-CM

## 2024-11-15 DIAGNOSIS — N32.89 BLADDER SPASM: ICD-10-CM

## 2024-11-15 DIAGNOSIS — Z67.91 RH NEGATIVE STATUS DURING PREGNANCY IN SECOND TRIMESTER: ICD-10-CM

## 2024-11-15 DIAGNOSIS — R39.198 DIFFICULTY VOIDING: ICD-10-CM

## 2024-11-15 DIAGNOSIS — O26.892 RH NEGATIVE STATUS DURING PREGNANCY IN SECOND TRIMESTER: ICD-10-CM

## 2024-11-15 DIAGNOSIS — Z34.02 ENCOUNTER FOR SUPERVISION OF NORMAL FIRST PREGNANCY IN SECOND TRIMESTER: Primary | ICD-10-CM

## 2024-11-15 NOTE — PROGRESS NOTES
Chief Complaint  Routine Prenatal Visit (Glucola done today, patient complains of bladder spasms. )    History of Present Illness:  Bandar is a  currently at 24w6d who presents today with complaints of possible bladder spasms.  Patient has been having suprapubic discomfort.  She does report at times having difficulty emptying her bladder as well.  She denies any dysuria or flank pain.  She denies any fever or chills.  She does report good fetal movement.  She denies any vaginal bleeding or spotting.    Exam:  Vitals:  See prenatal flowsheet as noted and reviewed  General: Alert, cooperative, and does not appear in any distress  Abdomen:   See prenatal flowsheet as noted and reviewed    Uterus gravid, non-tender; no palpable masses    No guarding or rebound tenderness  Pelvic:  See prenatal flowsheet as noted and reviewed  Ext:  See prenatal flowsheet as noted and reviewed    Moves extremities well, no cyanosis and no redness  Urine:  See prenatal flowsheet as noted and reviewed    Data Review:  The following data was reviewed by: Melyssa Garcias MD on 11/15/2024:  Prenatal Labs:  Lab Results   Component Value Date    HGB 11.3 2024    RUBELLAABIGG <0.90 (L) 2024    HEPBSAG Negative 2024    ABO O 2024    RH Negative 2024    ABSCRN Negative 2024    [id number] Non Reactive 2024    HEPCVIRUSABY Negative 2018       No visits with results within 1 Month(s) from this visit.   Latest known visit with results is:   Initial Prenatal on 2024   Component Date Value    Hepatitis B Surface Ag 2024 Negative     Hepatitis C Ab 2024 Non Reactive     RPR 2024 Non Reactive     Rubella Antibodies, IgG 2024 <0.90 (L)     ABO Type 2024 O     Rh Factor 2024 Negative     Antibody Screen 2024 Negative     HIV Screen 4th Gen w/RFX* 2024 Non Reactive     WBC 2024 10.7     RBC 2024 3.80     Hemoglobin 2024 11.3     Hematocrit  08/16/2024 35.4     MCV 08/16/2024 93     MCH 08/16/2024 29.7     MCHC 08/16/2024 31.9     RDW 08/16/2024 14.1     Platelets 08/16/2024 248     Neutrophil Rel % 08/16/2024 73     Lymphocyte Rel % 08/16/2024 19     Monocyte Rel % 08/16/2024 6     Eosinophil Rel % 08/16/2024 1     Basophil Rel % 08/16/2024 0     Neutrophils Absolute 08/16/2024 7.8 (H)     Lymphocytes Absolute 08/16/2024 2.1     Monocytes Absolute 08/16/2024 0.7     Eosinophils Absolute 08/16/2024 0.1     Basophils Absolute 08/16/2024 0.0     Immature Granulocyte Rel* 08/16/2024 1     Immature Grans Absolute 08/16/2024 0.1     Gestation 08/16/2024 Velez     Fetal Fraction 08/16/2024 8%     Gestational Age >9: 08/16/2024 Yes     Result 08/16/2024 Negative      Comments 08/16/2024 Comment     Approved By 08/16/2024 Comment     TRISOMY 21 (DOWN SYNDROM* 08/16/2024 Negative     TRISOMY 18 (ZAMBRANO SYND* 08/16/2024 Negative     TRISOMY 13 (PATAU SYNDRO* 08/16/2024 Negative     FETAL SEX 08/16/2024 Comment     MONOSOMY X (SOTO SYNDR* 08/16/2024 Not Detected     XYY (ENRIQUEZ SYNDROME) 08/16/2024 Not Detected     XXY (KLINEFELTER SYNDROM* 08/16/2024 Not Detected     XXX (TRIPLE X SYNDROME) 08/16/2024 Not Detected     NEGATIVE PREDICTIVE VALUE 08/16/2024 Note     POSITIVE PREDICTIVE VALUE 08/16/2024 N/A     About The Test 08/16/2024 Comment     Test Method 08/16/2024 Comment     Performance 08/16/2024 Comment     PERFORMANCE CHARACTERIST* 08/16/2024 Note     Limitations of the Test 08/16/2024 Comment     Note 08/16/2024 Comment     References 08/16/2024 Comment     Amphetamine, Urine Qual 08/16/2024 Negative     Barbiturates Screen, Uri* 08/16/2024 Negative     Benzodiazepine Screen, U* 08/16/2024 Negative     THC Screen, Urine 08/16/2024 Negative     Cocaine Screen, Urine 08/16/2024 Negative     Opiate Screen, Urine 08/16/2024 Negative     Oxycodone/Oxymorphone, U* 08/16/2024 Negative     Phencyclidine (PCP), Uri* 08/16/2024 Negative      Methadone Screen, Urine 2024 Negative     Propoxyphene Screen 2024 Negative     Creatinine, Urine 2024 21.2     pH, UA 2024 6.9     Please note 2024 Comment     Chlamydia trachomatis, N* 2024 Negative     Neisseria gonorrhoeae, N* 2024 Negative     Interpretation 2024 Comment      Imaging:  US Ob 14 + Weeks Single or First Gestation  Anatomic survey is within normal limits. Size is consistent with dates.   Active male fetus in the vertex position. Anterior placenta. Three-vessel   cord      Medical Records:  None    Assessment and Plan:  Problem List Items Addressed This Visit    None  Visit Diagnoses       Encounter for supervision of normal first pregnancy in second trimester    -  Primary  Topics discussed:     GERD management  iron supplementation  kick counts and fetal movement  PIH precautions   labor signs and symptoms  Labs today as noted    Relevant Orders    Gestational Screen 1 Hr ([facility])    CBC & Differential    Antibody Screen    Bladder spasm      Send urine for clean-catch UA culture and sensitivity.    Relevant Orders    Urinalysis With Microscopic - Urine, Clean Catch    Urine Culture - Urine, Urine, Clean Catch    Suprapubic pain      Patient instructed to increase her p.o. fluids.  Will send urine for culture.  Plan pending results.    Relevant Orders    Urinalysis With Microscopic - Urine, Clean Catch    Urine Culture - Urine, Urine, Clean Catch    Difficulty voiding      Patient to call if worsening and/or changes in her symptoms as discussed.    Relevant Orders    Urine Culture - Urine, Urine, Clean Catch    Rh negative status during pregnancy in second trimester      RhoGAM next visit.          Follow Up/Instructions:  Follow up as scheduled.  Patient was given instructions and counseling regarding her condition or for health maintenance advice. Please see specific information pulled into the AVS if appropriate.     Note: Speech  recognition transcription software may have been used to dictate portions of this document.  An attempt at proofreading has been made though minor errors in transcription may still be present.    This note was electronically signed.  Melyssa Garcias M.D.

## 2024-11-17 LAB
APPEARANCE UR: CLEAR
BACTERIA #/AREA URNS HPF: ABNORMAL /HPF
BACTERIA UR CULT: NORMAL
BACTERIA UR CULT: NORMAL
BASOPHILS # BLD AUTO: 0.02 10*3/MM3 (ref 0–0.2)
BASOPHILS NFR BLD AUTO: 0.2 % (ref 0–1.5)
BILIRUB UR QL STRIP: NEGATIVE
CASTS URNS MICRO: ABNORMAL
COLOR UR: YELLOW
EOSINOPHIL # BLD AUTO: 0.05 10*3/MM3 (ref 0–0.4)
EOSINOPHIL NFR BLD AUTO: 0.5 % (ref 0.3–6.2)
EPI CELLS #/AREA URNS HPF: ABNORMAL /HPF
ERYTHROCYTE [DISTWIDTH] IN BLOOD BY AUTOMATED COUNT: 13 % (ref 12.3–15.4)
GLUCOSE 1H P 50 G GLC PO SERPL-MCNC: 176 MG/DL (ref 65–139)
GLUCOSE UR QL STRIP: NEGATIVE
HCT VFR BLD AUTO: 31.4 % (ref 34–46.6)
HGB BLD-MCNC: 10 G/DL (ref 12–15.9)
HGB UR QL STRIP: NEGATIVE
IMM GRANULOCYTES # BLD AUTO: 0.1 10*3/MM3 (ref 0–0.05)
IMM GRANULOCYTES NFR BLD AUTO: 1 % (ref 0–0.5)
KETONES UR QL STRIP: ABNORMAL
LEUKOCYTE ESTERASE UR QL STRIP: NEGATIVE
LYMPHOCYTES # BLD AUTO: 1.62 10*3/MM3 (ref 0.7–3.1)
LYMPHOCYTES NFR BLD AUTO: 16.3 % (ref 19.6–45.3)
MCH RBC QN AUTO: 29.2 PG (ref 26.6–33)
MCHC RBC AUTO-ENTMCNC: 31.8 G/DL (ref 31.5–35.7)
MCV RBC AUTO: 91.8 FL (ref 79–97)
MONOCYTES # BLD AUTO: 0.5 10*3/MM3 (ref 0.1–0.9)
MONOCYTES NFR BLD AUTO: 5 % (ref 5–12)
NEUTROPHILS # BLD AUTO: 7.65 10*3/MM3 (ref 1.7–7)
NEUTROPHILS NFR BLD AUTO: 77 % (ref 42.7–76)
NITRITE UR QL STRIP: NEGATIVE
NRBC BLD AUTO-RTO: 0 /100 WBC (ref 0–0.2)
PH UR STRIP: 8 [PH] (ref 5–8)
PLATELET # BLD AUTO: 222 10*3/MM3 (ref 140–450)
PROT UR QL STRIP: NEGATIVE
RBC # BLD AUTO: 3.42 10*6/MM3 (ref 3.77–5.28)
RBC #/AREA URNS HPF: ABNORMAL /HPF
SP GR UR STRIP: 1.01 (ref 1–1.03)
UROBILINOGEN UR STRIP-MCNC: ABNORMAL MG/DL
WBC # BLD AUTO: 9.94 10*3/MM3 (ref 3.4–10.8)
WBC #/AREA URNS HPF: ABNORMAL /HPF

## 2024-11-19 RX ORDER — FERROUS SULFATE 325(65) MG
325 TABLET ORAL 2 TIMES DAILY
Qty: 60 TABLET | Refills: 5 | Status: SHIPPED | OUTPATIENT
Start: 2024-11-19 | End: 2024-12-19

## 2024-11-22 DIAGNOSIS — O24.410 DIET CONTROLLED GESTATIONAL DIABETES MELLITUS (GDM) IN SECOND TRIMESTER: Primary | ICD-10-CM

## 2024-11-22 DIAGNOSIS — Z3A.25 25 WEEKS GESTATION OF PREGNANCY: ICD-10-CM

## 2024-11-26 LAB
BACTERIA UR CULT: ABNORMAL
BACTERIA UR CULT: ABNORMAL
GLUCOSE 1H P 100 G GLC PO SERPL-MCNC: 197 MG/DL (ref 65–179)
GLUCOSE 2H P 100 G GLC PO SERPL-MCNC: 172 MG/DL (ref 65–154)
GLUCOSE 3H P 100 G GLC PO SERPL-MCNC: 97 MG/DL (ref 65–139)
GLUCOSE P FAST SERPL-MCNC: 88 MG/DL (ref 65–94)
OTHER ANTIBIOTIC SUSC ISLT: ABNORMAL

## 2024-11-26 RX ORDER — NITROFURANTOIN 25; 75 MG/1; MG/1
100 CAPSULE ORAL 2 TIMES DAILY
Qty: 14 CAPSULE | Refills: 0 | Status: SHIPPED | OUTPATIENT
Start: 2024-11-26 | End: 2024-12-03

## 2024-11-26 RX ORDER — PEN NEEDLE, DIABETIC 31 GX5/16"
1 NEEDLE, DISPOSABLE MISCELLANEOUS 4 TIMES DAILY
Qty: 100 EACH | Refills: 2 | Status: SHIPPED | OUTPATIENT
Start: 2024-11-26

## 2024-11-26 RX ORDER — BLOOD-GLUCOSE METER
1 KIT MISCELLANEOUS AS NEEDED
Qty: 1 EACH | Refills: 0 | Status: SHIPPED | OUTPATIENT
Start: 2024-11-26

## 2024-11-26 RX ORDER — LANCETS
EACH MISCELLANEOUS
Qty: 100 EACH | Refills: 2 | Status: SHIPPED | OUTPATIENT
Start: 2024-11-26

## 2024-12-06 ENCOUNTER — ROUTINE PRENATAL (OUTPATIENT)
Dept: OBSTETRICS AND GYNECOLOGY | Facility: CLINIC | Age: 24
End: 2024-12-06
Payer: COMMERCIAL

## 2024-12-06 VITALS — BODY MASS INDEX: 37.68 KG/M2 | DIASTOLIC BLOOD PRESSURE: 62 MMHG | SYSTOLIC BLOOD PRESSURE: 118 MMHG | WEIGHT: 206 LBS

## 2024-12-06 DIAGNOSIS — O24.410 DIET CONTROLLED GESTATIONAL DIABETES MELLITUS (GDM) IN THIRD TRIMESTER: ICD-10-CM

## 2024-12-06 DIAGNOSIS — Z67.91 RH NEGATIVE STATUS DURING PREGNANCY IN THIRD TRIMESTER: ICD-10-CM

## 2024-12-06 DIAGNOSIS — O26.893 RH NEGATIVE STATUS DURING PREGNANCY IN THIRD TRIMESTER: ICD-10-CM

## 2024-12-06 DIAGNOSIS — Z34.03 ENCOUNTER FOR SUPERVISION OF NORMAL FIRST PREGNANCY IN THIRD TRIMESTER: Primary | ICD-10-CM

## 2024-12-06 DIAGNOSIS — D50.9 IRON DEFICIENCY ANEMIA DURING PREGNANCY: ICD-10-CM

## 2024-12-06 DIAGNOSIS — O99.019 IRON DEFICIENCY ANEMIA DURING PREGNANCY: ICD-10-CM

## 2024-12-06 NOTE — PROGRESS NOTES
Chief Complaint  Routine Prenatal Visit (Rhogam given today, patient complains of cramping in left hip. )    History of Present Illness:  Bandar is a  currently at 27w6d who presents today with complaints of pain in her left groin.  Patient only has the pain when laying on her left side.  She denies any tightening or pain otherwise.  She denies any vaginal bleeding or spotting.  She has been checking her glucose levels.  Her fasting levels have been 90-1 01.  Her 1 hour postprandial levels have been normal other than breakfast with a maximum of 153.  She has not seen a dietitian.  She has been taking her prenatal vitamins and iron supplements.    Exam:  Vitals:  See prenatal flowsheet as noted and reviewed  General: Alert, cooperative, and does not appear in any distress  Abdomen:   See prenatal flowsheet as noted and reviewed    Uterus gravid, non-tender; no palpable masses    No guarding or rebound tenderness  Pelvic:  See prenatal flowsheet as noted and reviewed  Ext:  See prenatal flowsheet as noted and reviewed    Moves extremities well, no cyanosis and no redness  Urine:  See prenatal flowsheet as noted and reviewed    Data Review:  The following data was reviewed by: Melyssa Garcias MD on 2024:  Prenatal Labs:  Lab Results   Component Value Date    HGB 10.0 (L) 11/15/2024    RUBELLAABIGG <0.90 (L) 2024    HEPBSAG Negative 2024    ABO O 2024    RH Negative 2024    ABSCRN Negative 2024    BNY2ULP3 Non Reactive 2024    HEPCVIRUSABY Negative 2018    [id number] 197 (H) 2024    URINECX Final report (A) 2024       Orders Only on 2024   Component Date Value    Urine Culture 2024 Final report (A)     Result 1 2024 Enterococcus faecalis (A)     Susceptibility Testing 2024 Comment     Glucose - Fasting 2024 88     Glucose - 1 Hour 2024 197 (H)     Glucose - 2 Hour 2024 172 (H)     Glucose - 3 Hour 2024 97     Routine Prenatal on 11/15/2024   Component Date Value    Gestational Diabetes Scr* 11/15/2024 176 (H)     WBC 11/15/2024 9.94     RBC 11/15/2024 3.42 (L)     Hemoglobin 11/15/2024 10.0 (L)     Hematocrit 11/15/2024 31.4 (L)     MCV 11/15/2024 91.8     MCH 11/15/2024 29.2     MCHC 11/15/2024 31.8     RDW 11/15/2024 13.0     Platelets 11/15/2024 222     Neutrophil Rel % 11/15/2024 77.0 (H)     Lymphocyte Rel % 11/15/2024 16.3 (L)     Monocyte Rel % 11/15/2024 5.0     Eosinophil Rel % 11/15/2024 0.5     Basophil Rel % 11/15/2024 0.2     Neutrophils Absolute 11/15/2024 7.65 (H)     Lymphocytes Absolute 11/15/2024 1.62     Monocytes Absolute 11/15/2024 0.50     Eosinophils Absolute 11/15/2024 0.05     Basophils Absolute 11/15/2024 0.02     Immature Granulocyte Rel* 11/15/2024 1.0 (H)     Immature Grans Absolute 11/15/2024 0.10 (H)     nRBC 11/15/2024 0.0     Specific Gravity, UA 11/15/2024 1.011     pH, UA 11/15/2024 8.0     Color, UA 11/15/2024 Yellow     Appearance, UA 11/15/2024 Clear     Leukocytes, UA 11/15/2024 Negative     Protein 11/15/2024 Negative     Glucose, UA 11/15/2024 Negative     Ketones 11/15/2024 See below: (A)     Blood, UA 11/15/2024 Negative     Bilirubin, UA 11/15/2024 Negative     Urobilinogen, UA 11/15/2024 Comment     Nitrite, UA 11/15/2024 Negative     Urine Culture 11/15/2024 Final report     Result 1 11/15/2024 Comment     WBC, UA 11/15/2024 3-5 (A)     RBC, UA 11/15/2024 0-2     Epithelial Cells (non re* 11/15/2024 0-2     Cast Type 11/15/2024 Comment     Bacteria, UA 11/15/2024 Comment      Imaging:  US Ob 14 + Weeks Single or First Gestation  Anatomic survey is within normal limits. Size is consistent with dates.   Active male fetus in the vertex position. Anterior placenta. Three-vessel   cord      Medical Records:  None    Assessment and Plan:  Problem List Items Addressed This Visit    None  Visit Diagnoses       Encounter for supervision of normal first pregnancy in third trimester     -  Primary  Topics discussed:     glucose management  iron supplementation  kick counts and fetal movement  PIH precautions   labor signs and symptoms      Diet controlled gestational diabetes mellitus (GDM) in third trimester      Patient to continue monitoring her glucose levels as discussed.  I discussed with the patient dietary changes.  Referral has been placed to dietitian as well.    Relevant Orders    Ambulatory Referral to Nutrition Services    Iron deficiency anemia during pregnancy      Patient to continue her iron supplements 325 mg twice daily.  CBC next visit.    Rh negative status during pregnancy in third trimester      Repeat today as noted.    Relevant Orders    Rhogam Immune Globulin Immunization (Completed)          Follow Up/Instructions:  Follow up as scheduled.  Patient was given instructions and counseling regarding her condition or for health maintenance advice. Please see specific information pulled into the AVS if appropriate.     Note: Speech recognition transcription software may have been used to dictate portions of this document.  An attempt at proofreading has been made though minor errors in transcription may still be present.    This note was electronically signed.  Melyssa Garcias M.D.

## 2024-12-11 ENCOUNTER — TELEPHONE (OUTPATIENT)
Dept: OBSTETRICS AND GYNECOLOGY | Facility: CLINIC | Age: 24
End: 2024-12-11

## 2024-12-11 RX ORDER — ONDANSETRON 4 MG/1
4 TABLET, ORALLY DISINTEGRATING ORAL EVERY 8 HOURS PRN
Qty: 30 TABLET | Refills: 1 | Status: SHIPPED | OUTPATIENT
Start: 2024-12-11

## 2024-12-11 NOTE — TELEPHONE ENCOUNTER
Patient is requesting RX for nausea       SEND TO : [facility]           Thank You

## 2024-12-13 ENCOUNTER — TELEMEDICINE (OUTPATIENT)
Dept: INTERNAL MEDICINE | Facility: CLINIC | Age: 24
End: 2024-12-13
Payer: COMMERCIAL

## 2024-12-13 VITALS — WEIGHT: 207 LBS | BODY MASS INDEX: 38.09 KG/M2 | HEIGHT: 62 IN

## 2024-12-13 DIAGNOSIS — J01.00 ACUTE NON-RECURRENT MAXILLARY SINUSITIS: Primary | ICD-10-CM

## 2024-12-13 PROCEDURE — 99213 OFFICE O/P EST LOW 20 MIN: CPT | Performed by: STUDENT IN AN ORGANIZED HEALTH CARE EDUCATION/TRAINING PROGRAM

## 2024-12-13 RX ORDER — AMOXICILLIN 500 MG/1
500 CAPSULE ORAL 3 TIMES DAILY
Qty: 30 CAPSULE | Refills: 0 | Status: SHIPPED | OUTPATIENT
Start: 2024-12-13 | End: 2024-12-23

## 2024-12-13 NOTE — PROGRESS NOTES
You have chosen to receive care through a telehealth visit.  Do you consent to use a video/audio connection for your medical care today? Yes    Pt is in the state of [place].     Participating parties Ken Fischer CMA, Dr. Fagan

## 2024-12-13 NOTE — PROGRESS NOTES
"Chief Complaint  Nasal Congestion (Green yellow mucus, started Wednesday. Pt has pressure in her head. ), Earache (Pt says pressure and popping in her ears.), and Headache (Pt has pressure in her head. )    Subjective           Bandar Jules presents to [facility]  History of Present Illness  1 week of sinus congestion and facial pain, headache, thick green nasal drainage, sore throat.    Objective   Vital Signs:   Ht 157.5 cm (62\")   Wt 93.9 kg (207 lb)   BMI 37.86 kg/m²     Estimated body mass index is 37.86 kg/m² as calculated from the following:    Height as of this encounter: 157.5 cm (62\").    Weight as of this encounter: 93.9 kg (207 lb).     Physical Exam   Constitutional: She appears well-developed and well-nourished.   HENT:   Head: Normocephalic and atraumatic.   Eyes: Conjunctivae are normal.   Neck: Neck normal appearance.  Pulmonary/Chest: Effort normal.   Neurological: She is alert.   Psychiatric: She has a normal mood and affect.     Result Review :                   Assessment and Plan      Diagnoses and all orders for this visit:    1. Acute non-recurrent maxillary sinusitis (Primary)  -     amoxicillin (AMOXIL) 500 MG capsule; Take 1 capsule by mouth 3 (Three) Times a Day for 10 days.  Dispense: 30 capsule; Refill: 0        Follow Up     No follow-ups on file.  Patient was given instructions and counseling regarding her condition or for health maintenance advice. Please see specific information pulled into the AVS if appropriate.     Mode of Visit: Video  Location of patient: home  Location of provider: home  You have chosen to receive care through a telehealth visit.  The patient has signed the video visit consent form.  The visit included audio and video interaction. No technical issues occurred during this visit.   "

## 2024-12-18 ENCOUNTER — HOSPITAL ENCOUNTER (OUTPATIENT)
Facility: HOSPITAL | Age: 24
Discharge: HOME | End: 2024-12-18
Attending: OBSTETRICS & GYNECOLOGY | Admitting: OBSTETRICS & GYNECOLOGY
Payer: COMMERCIAL

## 2024-12-18 VITALS
OXYGEN SATURATION: 100 % | HEART RATE: 83 BPM | BODY MASS INDEX: 38.04 KG/M2 | WEIGHT: 208 LBS | RESPIRATION RATE: 16 BRPM | SYSTOLIC BLOOD PRESSURE: 119 MMHG | DIASTOLIC BLOOD PRESSURE: 74 MMHG

## 2024-12-18 PROCEDURE — 59025 FETAL NON-STRESS TEST: CPT

## 2024-12-18 PROCEDURE — G0463 HOSPITAL OUTPT CLINIC VISIT: HCPCS

## 2024-12-18 RX ORDER — LIDOCAINE HYDROCHLORIDE 10 MG/ML
0.5 INJECTION, SOLUTION INFILTRATION; PERINEURAL ONCE AS NEEDED
Status: DISCONTINUED | OUTPATIENT
Start: 2024-12-18 | End: 2024-12-18 | Stop reason: HOSPADM

## 2024-12-18 RX ORDER — SODIUM CHLORIDE 0.9 % (FLUSH) 0.9 %
10 SYRINGE (ML) INJECTION AS NEEDED
Status: DISCONTINUED | OUTPATIENT
Start: 2024-12-18 | End: 2024-12-18 | Stop reason: HOSPADM

## 2024-12-18 RX ORDER — SODIUM CHLORIDE 0.9 % (FLUSH) 0.9 %
10 SYRINGE (ML) INJECTION EVERY 12 HOURS SCHEDULED
Status: DISCONTINUED | OUTPATIENT
Start: 2024-12-18 | End: 2024-12-18 | Stop reason: HOSPADM

## 2024-12-18 RX ORDER — SODIUM CHLORIDE 9 MG/ML
40 INJECTION, SOLUTION INTRAVENOUS AS NEEDED
Status: DISCONTINUED | OUTPATIENT
Start: 2024-12-18 | End: 2024-12-18 | Stop reason: HOSPADM

## 2024-12-19 NOTE — NON STRESS TEST
Non Stress Test    [facility]    Patient: Bandar Jules  : 2000  MRN: [id number]  CSN: [id number]    Gestational Age: 29w4d    Indication for NST decreased fetal movement       Time On 20:53   Time Off 21:31       Interpretation    Baseline 's beats per minute   Category 1   Decelerations Absent       Additional Comments See nursing notes       Recommendations for f/u See nursing notes       This note has been electronically signed.    Melyssa Garcias M.D.

## 2024-12-19 NOTE — NON STRESS TEST
Triage Note - Nursing Documentation  Labor and Delivery Admission Log    Bandar Jules  : 2000  MRN: [id number]  CSN: [id number]    Date in / Time in:  2024  Time in:     Date out / Time out:  2024  Time out:     Nurse: Brittany Abbott RN    Patient Info: She is a 24 y.o. year old  at 29w4d with an ALEJANDRA of 3/1/2025, by Ultrasound who was seen on the [facility] Labor [facility].    Chief Complaint:   Chief Complaint   Patient presents with    Decreased Fetal Movement       Provider Instructions / Disposition: VSS.  FHT appropriate for gest age, 10x10 accels noted.  Pt reports feeling fetal movement prior to discharge.  Pt discharged home, to follow up  for regularly scheduled prenatal appt.    Patient Active Problem List   Diagnosis    Dermatographia    Skin tag of labia       NST Documentation (Only applicable > 32 weeks):

## 2024-12-20 ENCOUNTER — ROUTINE PRENATAL (OUTPATIENT)
Dept: OBSTETRICS AND GYNECOLOGY | Facility: CLINIC | Age: 24
End: 2024-12-20
Payer: COMMERCIAL

## 2024-12-20 VITALS — BODY MASS INDEX: 37.49 KG/M2 | WEIGHT: 205 LBS | SYSTOLIC BLOOD PRESSURE: 112 MMHG | DIASTOLIC BLOOD PRESSURE: 70 MMHG

## 2024-12-20 DIAGNOSIS — Z34.93 PRENATAL CARE IN THIRD TRIMESTER: Primary | ICD-10-CM

## 2024-12-20 DIAGNOSIS — O24.410 GDM (GESTATIONAL DIABETES MELLITUS), CLASS A1: ICD-10-CM

## 2024-12-23 PROBLEM — O24.410 GDM (GESTATIONAL DIABETES MELLITUS), CLASS A1: Status: ACTIVE | Noted: 2024-12-23

## 2024-12-23 NOTE — PROGRESS NOTES
Prenatal Care Visit    Subjective   Chief Complaint   Patient presents with    Routine Prenatal Visit     Reviews sugar log        History:   Bandar is a  currently at 29w6d who presents for a prenatal care visit today.    Checking FSBG levels at home.    Social History    Tobacco Use      Smoking status: Never        Passive exposure: Never      Smokeless tobacco: Never       Objective   /70   Wt 93 kg (205 lb)   BMI 37.49 kg/m²   Physical Exam:  Normal, gestational age-appropriate exam today        Plan   Medical Decision Making:    I have reviewed the prenatal labs and ultrasound(s) today. I have reviewed the most recent prenatal progress note(s).    Diagnosis: Supervision of high risk pregnancy  DM - GDMA1  Rh NEG   Tests/Orders/Rx today: No orders of the defined types were placed in this encounter.      Medication Management: none     Topics discussed: Prenatal care milestones  glucose management  kick counts and fetal movement  PIH precautions   labor signs and symptoms   Tests next visit: U/S for EFW   Next visit: 2 week(s)     Rei Rivera MD  Obstetrics and Gynecology  [facility]

## 2024-12-30 ENCOUNTER — TELEPHONE (OUTPATIENT)
Dept: OBSTETRICS AND GYNECOLOGY | Facility: CLINIC | Age: 24
End: 2024-12-30
Payer: COMMERCIAL

## 2024-12-30 NOTE — TELEPHONE ENCOUNTER
Caller: [employer]    Relationship:     Best call back number: [phone]     What form or medical record are you requesting: FMLA PAPER WORK    Who is requesting this form or medical record from you: EMPLOYER    How would you like to receive the form or medical records (pick-up, mail, fax):  FRIDAY 1/03/2025    Timeframe paperwork needed: 1/03/2025    Additional notes:  RIP CALLING IN NEEDING FMLA PAPERWORK. WILL DROP OF TODAY 12/30/2024. NEEDS TO  BY FRIDAY 1/03/2025.

## 2025-01-10 ENCOUNTER — ROUTINE PRENATAL (OUTPATIENT)
Dept: OBSTETRICS AND GYNECOLOGY | Facility: CLINIC | Age: 25
End: 2025-01-10
Payer: COMMERCIAL

## 2025-01-10 VITALS — BODY MASS INDEX: 37.31 KG/M2 | SYSTOLIC BLOOD PRESSURE: 122 MMHG | WEIGHT: 204 LBS | DIASTOLIC BLOOD PRESSURE: 78 MMHG

## 2025-01-10 DIAGNOSIS — D50.9 IRON DEFICIENCY ANEMIA DURING PREGNANCY: ICD-10-CM

## 2025-01-10 DIAGNOSIS — O99.019 IRON DEFICIENCY ANEMIA DURING PREGNANCY: ICD-10-CM

## 2025-01-10 DIAGNOSIS — Z34.93 PRENATAL CARE IN THIRD TRIMESTER: Primary | ICD-10-CM

## 2025-01-10 DIAGNOSIS — O24.410 GDM (GESTATIONAL DIABETES MELLITUS), CLASS A1: ICD-10-CM

## 2025-01-10 NOTE — PROGRESS NOTES
Prenatal Care Visit    Subjective   Chief Complaint   Patient presents with    Routine Prenatal Visit     No Complaints/concerns        History:   Bandar is a  currently at 32w6d who presents for a prenatal care visit today.    No major issues.    Social History    Tobacco Use      Smoking status: Never        Passive exposure: Never      Smokeless tobacco: Never       Objective   /78   Wt 92.5 kg (204 lb)   BMI 37.31 kg/m²   Physical Exam:  Normal, gestational age-appropriate exam today        Plan   Medical Decision Making:    I have reviewed the prenatal labs and ultrasound(s) today. I have reviewed the most recent prenatal progress note(s).    Diagnosis: Supervision of high risk pregnancy  DM - GDMA1  Rh NEG   Tests/Orders/Rx today: Orders Placed This Encounter   Procedures    CBC & Differential     Order Specific Question:   Manual Differential     Answer:   No     Order Specific Question:   Release to patient     Answer:   Routine Release [[id number]]       Medication Management: none     Topics discussed: Prenatal care milestones  glucose management  kick counts and fetal movement  PIH precautions   labor signs and symptoms   Tests next visit: none   Next visit: 2 week(s)     Rei Rivera MD  Obstetrics and Gynecology  [facility]

## 2025-01-11 LAB
BASOPHILS # BLD AUTO: 0.02 10*3/MM3 (ref 0–0.2)
BASOPHILS NFR BLD AUTO: 0.2 % (ref 0–1.5)
EOSINOPHIL # BLD AUTO: 0.03 10*3/MM3 (ref 0–0.4)
EOSINOPHIL NFR BLD AUTO: 0.3 % (ref 0.3–6.2)
ERYTHROCYTE [DISTWIDTH] IN BLOOD BY AUTOMATED COUNT: 13.4 % (ref 12.3–15.4)
HCT VFR BLD AUTO: 31.7 % (ref 34–46.6)
HGB BLD-MCNC: 10.9 G/DL (ref 12–15.9)
IMM GRANULOCYTES # BLD AUTO: 0.1 10*3/MM3 (ref 0–0.05)
IMM GRANULOCYTES NFR BLD AUTO: 1.1 % (ref 0–0.5)
LYMPHOCYTES # BLD AUTO: 1.76 10*3/MM3 (ref 0.7–3.1)
LYMPHOCYTES NFR BLD AUTO: 19.1 % (ref 19.6–45.3)
MCH RBC QN AUTO: 30.5 PG (ref 26.6–33)
MCHC RBC AUTO-ENTMCNC: 34.4 G/DL (ref 31.5–35.7)
MCV RBC AUTO: 88.8 FL (ref 79–97)
MONOCYTES # BLD AUTO: 0.74 10*3/MM3 (ref 0.1–0.9)
MONOCYTES NFR BLD AUTO: 8 % (ref 5–12)
NEUTROPHILS # BLD AUTO: 6.56 10*3/MM3 (ref 1.7–7)
NEUTROPHILS NFR BLD AUTO: 71.3 % (ref 42.7–76)
NRBC BLD AUTO-RTO: 0 /100 WBC (ref 0–0.2)
PLATELET # BLD AUTO: 196 10*3/MM3 (ref 140–450)
RBC # BLD AUTO: 3.57 10*6/MM3 (ref 3.77–5.28)
WBC # BLD AUTO: 9.21 10*3/MM3 (ref 3.4–10.8)

## 2025-01-24 ENCOUNTER — ROUTINE PRENATAL (OUTPATIENT)
Dept: OBSTETRICS AND GYNECOLOGY | Facility: CLINIC | Age: 25
End: 2025-01-24
Payer: COMMERCIAL

## 2025-01-24 VITALS — SYSTOLIC BLOOD PRESSURE: 126 MMHG | WEIGHT: 206 LBS | BODY MASS INDEX: 37.68 KG/M2 | DIASTOLIC BLOOD PRESSURE: 68 MMHG

## 2025-01-24 DIAGNOSIS — Z34.03 ENCOUNTER FOR SUPERVISION OF NORMAL FIRST PREGNANCY IN THIRD TRIMESTER: Primary | ICD-10-CM

## 2025-01-24 DIAGNOSIS — O99.019 IRON DEFICIENCY ANEMIA DURING PREGNANCY: ICD-10-CM

## 2025-01-24 DIAGNOSIS — O24.410 DIET CONTROLLED GESTATIONAL DIABETES MELLITUS (GDM) IN THIRD TRIMESTER: ICD-10-CM

## 2025-01-24 DIAGNOSIS — D50.9 IRON DEFICIENCY ANEMIA DURING PREGNANCY: ICD-10-CM

## 2025-01-24 RX ORDER — LANCETS
EACH MISCELLANEOUS
Qty: 100 EACH | Refills: 2 | Status: SHIPPED | OUTPATIENT
Start: 2025-01-24

## 2025-01-25 NOTE — PROGRESS NOTES
Chief Complaint  Routine Prenatal Visit (No complaints. )    History of Present Illness:  Bandar is a  currently at 35w0d who presents today with no significant complaints.  Patient has continued monitoring her glucose levels.  She reports her fasting levels have been 85 to an occasional 95.  She reports her 1 hour postprandials have been predominantly less than 140s.  She will have an occasional elevated level but not often.  She reports good fetal movement.  She denies any significant cramping or contractions.  She is taking her prenatal vitamins and iron supplements.  She is wanting to schedule an elective induction at 39 weeks.    Exam:  Vitals:  See prenatal flowsheet as noted and reviewed  General: Alert, cooperative, and does not appear in any distress  Abdomen:   See prenatal flowsheet as noted and reviewed    Uterus gravid, non-tender; no palpable masses    No guarding or rebound tenderness  Pelvic:  See prenatal flowsheet as noted and reviewed  Ext:  See prenatal flowsheet as noted and reviewed    Moves extremities well, no cyanosis and no redness  Urine:  See prenatal flowsheet as noted and reviewed    Data Review:  The following data was reviewed by: Melyssa Garcias MD on 2025:  Prenatal Labs:  Lab Results   Component Value Date    HGB 10.9 (L) 01/10/2025    RUBELLAABIGG <0.90 (L) 2024    HEPBSAG Negative 2024    ABO O 2024    RH Negative 2024    ABSCRN Negative 2024    [id number] Non Reactive 2024    HEPCVIRUSABY Negative 2018    [id number] 197 (H) 2024    URINECX Final report (A) 2024       Routine Prenatal on 01/10/2025   Component Date Value    WBC 01/10/2025 9.21     RBC 01/10/2025 3.57 (L)     Hemoglobin 01/10/2025 10.9 (L)     Hematocrit 01/10/2025 31.7 (L)     MCV 01/10/2025 88.8     MCH 01/10/2025 30.5     MCHC 01/10/2025 34.4     RDW 01/10/2025 13.4     Platelets 01/10/2025 196     Neutrophil Rel % 01/10/2025 71.3     Lymphocyte Rel %  01/10/2025 19.1 (L)     Monocyte Rel % 01/10/2025 8.0     Eosinophil Rel % 01/10/2025 0.3     Basophil Rel % 01/10/2025 0.2     Neutrophils Absolute 01/10/2025 6.56     Lymphocytes Absolute 01/10/2025 1.76     Monocytes Absolute 01/10/2025 0.74     Eosinophils Absolute 01/10/2025 0.03     Basophils Absolute 01/10/2025 0.02     Immature Granulocyte Rel* 01/10/2025 1.1 (H)     Immature Grans Absolute 01/10/2025 0.10 (H)     nRBC 01/10/2025 0.0      Imaging:  US Ob Follow Up Transabdominal Approach  Impression:   IUP at 32+6 weeks. Limited anatomy was reviewed today and is normal in   appearance. EFW 2280g(70%) AC 81%. Cephalic presentation. Placenta is   anterior. Amniotic fluid and fetal heart rate are normal.    Rei Rivera MD  Obstetrics and Gynecology  [facility]      Medical Records:  None    Assessment and Plan:  Problem List Items Addressed This Visit    None  Visit Diagnoses       Encounter for supervision of normal first pregnancy in third trimester    -  Primary  Topics discussed:     glucose management  induction of labor  iron supplementation  kick counts and fetal movement  PIH precautions   labor signs and symptoms      Diet controlled gestational diabetes mellitus (GDM) in third trimester      Patient to continue monitoring her glucose levels.  Follow-up scan for growth as well as BPP.    Relevant Orders    US Ob Follow Up Transabdominal Approach    US Fetal Biophysical Profile;Without Non-Stress Testing    Iron deficiency anemia during pregnancy      Patient to continue her iron supplements and prenatal vitamins.          Follow Up/Instructions:  Follow up as scheduled.  Patient was given instructions and counseling regarding her condition or for health maintenance advice. Please see specific information pulled into the AVS if appropriate.     Note: Speech recognition transcription software may have been used to dictate portions of this document.  An attempt at proofreading has  been made though minor errors in transcription may still be present.    This note was electronically signed.  Melyssa Garcias M.D.

## 2025-02-03 ENCOUNTER — ROUTINE PRENATAL (OUTPATIENT)
Dept: OBSTETRICS AND GYNECOLOGY | Facility: CLINIC | Age: 25
End: 2025-02-03
Payer: COMMERCIAL

## 2025-02-03 VITALS — BODY MASS INDEX: 37.42 KG/M2 | SYSTOLIC BLOOD PRESSURE: 110 MMHG | DIASTOLIC BLOOD PRESSURE: 60 MMHG | WEIGHT: 204.6 LBS

## 2025-02-03 DIAGNOSIS — Z36.85 ENCOUNTER FOR ANTENATAL SCREENING FOR STREPTOCOCCUS B: Primary | ICD-10-CM

## 2025-02-03 RX ORDER — PNV NO.95/FERROUS FUM/FOLIC AC 28MG-0.8MG
TABLET ORAL
COMMUNITY
Start: 2024-11-22

## 2025-02-03 NOTE — PROGRESS NOTES
Chief Complaint   Patient presents with    Routine Prenatal Visit     Prenatal visit with Growth scan and GBS done today. No problems or concerns        HPI:   , 36w2d gestation reports doing well    ROS:  See Prenatal Episode/Flowsheet  /60   Wt 92.8 kg (204 lb 9.6 oz)   BMI 37.42 kg/m²      EXAM:  EXTREMITIES:  No swelling-See Prenatal Episode/Flowsheet    ABDOMEN:  FHTs/Movement noted-See Prenatal Episode/Flowsheet    URINE GLUCOSE/PROTEIN:  See Prenatal Episode/Flowsheet    PELVIC EXAM:  See Prenatal Episode/Flowsheet  CV:  Lungs:  GYN:    MDM:    Lab Results   Component Value Date    HGB 10.9 (L) 01/10/2025    RUBELLAABIGG <0.90 (L) 2024    HEPBSAG Negative 2024    ABO O 2024    RH Negative 2024    ABSCRN Negative 2024    [id number] Non Reactive 2024    HEPCVIRUSABY Negative 2018    [id number] 197 (H) 2024    URINECX Final report (A) 2024       U/S: Overall growth 55th 5.1 percentile.  JOVANA 10.63.  BPP 8 out of 8.  Vertex.  Anterior placenta    1. IUP 36w2d  2. Routine care   3. Anemia: taking Fe and PNV  4. A1 GDM: nprmal FSBS

## 2025-02-05 LAB — GP B STREP DNA SPEC QL NAA+PROBE: NEGATIVE

## 2025-02-14 ENCOUNTER — ROUTINE PRENATAL (OUTPATIENT)
Dept: OBSTETRICS AND GYNECOLOGY | Facility: CLINIC | Age: 25
End: 2025-02-14
Payer: COMMERCIAL

## 2025-02-14 VITALS — DIASTOLIC BLOOD PRESSURE: 80 MMHG | WEIGHT: 209.1 LBS | BODY MASS INDEX: 38.24 KG/M2 | SYSTOLIC BLOOD PRESSURE: 116 MMHG

## 2025-02-14 DIAGNOSIS — O24.410 GDM (GESTATIONAL DIABETES MELLITUS), CLASS A1: Primary | ICD-10-CM

## 2025-02-14 NOTE — PROGRESS NOTES
Chief Complaint   Patient presents with    Routine Prenatal Visit     BPP done yesterday       HPI: Bandar is a  currently at 37w6d here for prenatal visit who reports the following:  Baby is active. She has been having irregular ctxs. She does want an induction after 39 weeks. She plans to breastfeed. FBS 85-95, 1 hr pp mostly <130s unless she has a carb heavy meal.                EXAM:     Vitals:    25 1059   BP: 116/80      Abdomen:   See prenatal flowsheet as noted and reviewed, soft, nontender   Pelvic:  /-2 posterior, soft and to right side   Urine:  See prenatal flowsheet as noted and reviewed    Lab Results   Component Value Date    ABO O 2024    RH Negative 2024    ABSCRN Negative 2024       MDM:  Impression: Supervision of low risk pregnancy  DM - GDMA1  Anemia   Tests done today: BPP -    Topics discussed: induction of labor  kick counts and fetal movement  labor signs and symptoms  Reviewed OB labs   Tests next visit: BPP                RTO:                        1 weeks    This note was electronically signed.  Marion Boateng, APRN  2025

## 2025-02-20 ENCOUNTER — HOSPITAL ENCOUNTER (OUTPATIENT)
Facility: HOSPITAL | Age: 25
Discharge: HOME | End: 2025-02-20
Attending: OBSTETRICS & GYNECOLOGY | Admitting: OBSTETRICS & GYNECOLOGY
Payer: COMMERCIAL

## 2025-02-20 VITALS
DIASTOLIC BLOOD PRESSURE: 60 MMHG | RESPIRATION RATE: 16 BRPM | BODY MASS INDEX: 39.2 KG/M2 | HEIGHT: 62 IN | WEIGHT: 213 LBS | TEMPERATURE: 97.9 F | OXYGEN SATURATION: 100 % | HEART RATE: 82 BPM | SYSTOLIC BLOOD PRESSURE: 108 MMHG

## 2025-02-20 LAB
BILIRUB BLD-MCNC: NEGATIVE MG/DL
CLARITY, POC: CLEAR
COLOR UR: YELLOW
GLUCOSE UR STRIP-MCNC: NEGATIVE MG/DL
KETONES UR QL: NEGATIVE
LEUKOCYTE EST, POC: ABNORMAL
NITRITE UR-MCNC: NEGATIVE MG/ML
PH UR: 7.5 [PH] (ref 5–8)
PROT UR STRIP-MCNC: NEGATIVE MG/DL
RBC # UR STRIP: NEGATIVE /UL
SP GR UR: 1 (ref 1–1.03)
UROBILINOGEN UR QL: NORMAL

## 2025-02-20 PROCEDURE — 81002 URINALYSIS NONAUTO W/O SCOPE: CPT | Performed by: OBSTETRICS & GYNECOLOGY

## 2025-02-20 PROCEDURE — G0463 HOSPITAL OUTPT CLINIC VISIT: HCPCS

## 2025-02-20 PROCEDURE — 59025 FETAL NON-STRESS TEST: CPT

## 2025-02-20 PROCEDURE — 84112 EVAL AMNIOTIC FLUID PROTEIN: CPT | Performed by: OBSTETRICS & GYNECOLOGY

## 2025-02-20 RX ORDER — LIDOCAINE HYDROCHLORIDE 10 MG/ML
0.5 INJECTION, SOLUTION INFILTRATION; PERINEURAL ONCE AS NEEDED
Status: DISCONTINUED | OUTPATIENT
Start: 2025-02-20 | End: 2025-02-20 | Stop reason: HOSPADM

## 2025-02-20 RX ORDER — SODIUM CHLORIDE 0.9 % (FLUSH) 0.9 %
10 SYRINGE (ML) INJECTION AS NEEDED
Status: DISCONTINUED | OUTPATIENT
Start: 2025-02-20 | End: 2025-02-20 | Stop reason: HOSPADM

## 2025-02-20 RX ORDER — SODIUM CHLORIDE 0.9 % (FLUSH) 0.9 %
10 SYRINGE (ML) INJECTION EVERY 12 HOURS SCHEDULED
Status: DISCONTINUED | OUTPATIENT
Start: 2025-02-20 | End: 2025-02-20 | Stop reason: HOSPADM

## 2025-02-20 RX ORDER — SODIUM CHLORIDE 9 MG/ML
40 INJECTION, SOLUTION INTRAVENOUS AS NEEDED
Status: DISCONTINUED | OUTPATIENT
Start: 2025-02-20 | End: 2025-02-20 | Stop reason: HOSPADM

## 2025-02-21 ENCOUNTER — ROUTINE PRENATAL (OUTPATIENT)
Dept: OBSTETRICS AND GYNECOLOGY | Facility: CLINIC | Age: 25
End: 2025-02-21
Payer: COMMERCIAL

## 2025-02-21 ENCOUNTER — PREP FOR SURGERY (OUTPATIENT)
Dept: OTHER | Facility: HOSPITAL | Age: 25
End: 2025-02-21
Payer: COMMERCIAL

## 2025-02-21 VITALS — BODY MASS INDEX: 38.23 KG/M2 | DIASTOLIC BLOOD PRESSURE: 76 MMHG | SYSTOLIC BLOOD PRESSURE: 110 MMHG | WEIGHT: 209 LBS

## 2025-02-21 DIAGNOSIS — Z34.93 PRENATAL CARE IN THIRD TRIMESTER: Primary | ICD-10-CM

## 2025-02-21 DIAGNOSIS — O99.019 MATERNAL ANEMIA IN PREGNANCY, ANTEPARTUM: ICD-10-CM

## 2025-02-21 DIAGNOSIS — Z67.91 RH NEGATIVE STATUS DURING PREGNANCY IN THIRD TRIMESTER: ICD-10-CM

## 2025-02-21 DIAGNOSIS — O24.410 GDM (GESTATIONAL DIABETES MELLITUS), CLASS A1: ICD-10-CM

## 2025-02-21 DIAGNOSIS — O26.893 RH NEGATIVE STATUS DURING PREGNANCY IN THIRD TRIMESTER: ICD-10-CM

## 2025-02-21 LAB — A1 MICROGLOB PLACENTAL VAG QL: NEGATIVE

## 2025-02-21 RX ORDER — OXYTOCIN/0.9 % SODIUM CHLORIDE 30/500 ML
250 PLASTIC BAG, INJECTION (ML) INTRAVENOUS CONTINUOUS
Status: CANCELLED | OUTPATIENT
Start: 2025-02-21 | End: 2025-02-21

## 2025-02-21 RX ORDER — ACETAMINOPHEN 325 MG/1
650 TABLET ORAL EVERY 4 HOURS PRN
Status: CANCELLED | OUTPATIENT
Start: 2025-02-21

## 2025-02-21 RX ORDER — OXYTOCIN/0.9 % SODIUM CHLORIDE 30/500 ML
999 PLASTIC BAG, INJECTION (ML) INTRAVENOUS ONCE
Status: CANCELLED | OUTPATIENT
Start: 2025-02-21 | End: 2025-02-21

## 2025-02-21 RX ORDER — SODIUM CHLORIDE 0.9 % (FLUSH) 0.9 %
10 SYRINGE (ML) INJECTION AS NEEDED
Status: CANCELLED | OUTPATIENT
Start: 2025-02-21

## 2025-02-21 RX ORDER — LIDOCAINE HYDROCHLORIDE 10 MG/ML
0.5 INJECTION, SOLUTION INFILTRATION; PERINEURAL ONCE AS NEEDED
Status: CANCELLED | OUTPATIENT
Start: 2025-02-21

## 2025-02-21 RX ORDER — ONDANSETRON 2 MG/ML
4 INJECTION INTRAMUSCULAR; INTRAVENOUS EVERY 6 HOURS PRN
Status: CANCELLED | OUTPATIENT
Start: 2025-02-21

## 2025-02-21 RX ORDER — SODIUM CHLORIDE 9 MG/ML
40 INJECTION, SOLUTION INTRAVENOUS AS NEEDED
Status: CANCELLED | OUTPATIENT
Start: 2025-02-21

## 2025-02-21 RX ORDER — PROMETHAZINE HYDROCHLORIDE 12.5 MG/1
12.5 TABLET ORAL EVERY 6 HOURS PRN
Status: CANCELLED | OUTPATIENT
Start: 2025-02-21

## 2025-02-21 RX ORDER — SODIUM CHLORIDE 0.9 % (FLUSH) 0.9 %
10 SYRINGE (ML) INJECTION EVERY 12 HOURS SCHEDULED
Status: CANCELLED | OUTPATIENT
Start: 2025-02-21

## 2025-02-21 RX ORDER — PROMETHAZINE HYDROCHLORIDE 12.5 MG/1
12.5 SUPPOSITORY RECTAL EVERY 6 HOURS PRN
Status: CANCELLED | OUTPATIENT
Start: 2025-02-21

## 2025-02-21 RX ORDER — CARBOPROST TROMETHAMINE 250 UG/ML
250 INJECTION, SOLUTION INTRAMUSCULAR AS NEEDED
Status: CANCELLED | OUTPATIENT
Start: 2025-02-21

## 2025-02-21 RX ORDER — IBUPROFEN 600 MG/1
600 TABLET, FILM COATED ORAL EVERY 6 HOURS PRN
Status: CANCELLED | OUTPATIENT
Start: 2025-02-21

## 2025-02-21 RX ORDER — METHYLERGONOVINE MALEATE 0.2 MG/ML
200 INJECTION INTRAVENOUS ONCE AS NEEDED
Status: CANCELLED | OUTPATIENT
Start: 2025-02-21

## 2025-02-21 RX ORDER — TERBUTALINE SULFATE 1 MG/ML
0.25 INJECTION, SOLUTION SUBCUTANEOUS AS NEEDED
Status: CANCELLED | OUTPATIENT
Start: 2025-02-21

## 2025-02-21 RX ORDER — SODIUM CHLORIDE, SODIUM LACTATE, POTASSIUM CHLORIDE, CALCIUM CHLORIDE 600; 310; 30; 20 MG/100ML; MG/100ML; MG/100ML; MG/100ML
125 INJECTION, SOLUTION INTRAVENOUS CONTINUOUS
Status: CANCELLED | OUTPATIENT
Start: 2025-02-21 | End: 2025-02-23

## 2025-02-21 RX ORDER — MORPHINE SULFATE 2 MG/ML
2 INJECTION, SOLUTION INTRAMUSCULAR; INTRAVENOUS
Status: CANCELLED | OUTPATIENT
Start: 2025-02-21 | End: 2025-03-03

## 2025-02-21 RX ORDER — MISOPROSTOL 200 UG/1
800 TABLET ORAL AS NEEDED
Status: CANCELLED | OUTPATIENT
Start: 2025-02-21

## 2025-02-21 RX ORDER — ONDANSETRON 4 MG/1
4 TABLET, ORALLY DISINTEGRATING ORAL EVERY 6 HOURS PRN
Status: CANCELLED | OUTPATIENT
Start: 2025-02-21

## 2025-02-21 RX ORDER — OXYTOCIN/0.9 % SODIUM CHLORIDE 30/500 ML
2-20 PLASTIC BAG, INJECTION (ML) INTRAVENOUS
Status: CANCELLED | OUTPATIENT
Start: 2025-02-21

## 2025-02-21 RX ORDER — MAGNESIUM CARB/ALUMINUM HYDROX 105-160MG
30 TABLET,CHEWABLE ORAL ONCE
Status: CANCELLED | OUTPATIENT
Start: 2025-02-21 | End: 2025-02-21

## 2025-02-21 NOTE — SIGNIFICANT NOTE
02/20/25 2045   Provider Notification   Reason for Communication Evaluate   Provider Name RN notified pt arrived c/o leaking of fluid. Amnisure completed and negative, VE is 2/60/-3. Pt kj  irregularly but not feeling them rates pain a 2. Active fetal movement and reactive FHR. Pt hydrated and RN received orders for d/c to home.   Notification Route Phone call   Response See orders

## 2025-02-21 NOTE — PROGRESS NOTES
Prenatal Care Visit    Subjective   Chief Complaint   Patient presents with    Pregnancy Ultrasound     BPP scan done today.      History:   Bandar is a  currently at 38w6d who presents for a prenatal care visit today.    Reports ongoing CTX and losing her mucus plug. She thought her water broke last night, prompting her to present to . She has not had further LOF. Reports (+) FM.    Fastin this morning  Post-prandial: all < 140       Objective   /76   Wt 94.8 kg (209 lb)   BMI 38.23 kg/m²   Physical Exam:  Normal, gestational age-appropriate exam today   CVX: 2-3/50/-2, posterior, medium consistency. Membranes swept, well tolerated.       Assessment & Plan     IUP @ 38w6d  Routine care: I have reviewed the prenatal labs and ultrasound(s) today. I have reviewed the most recent prenatal progress note(s). GBS (-). BPP today - , cephalic presentation, JOVANA 6.9 cm with deepest vertical pocket of 2.1 cm.  A1GDM: well controlled, continue dietary measures  Anemia: continue iron supplement  Rh (-): s/p Rhogam 24  Rubella nonimmune: MMR PP     Diagnosis Plan   1. Prenatal care in third trimester        2. GDM (gestational diabetes mellitus), class A1        3. Maternal anemia in pregnancy, antepartum        4. Rh negative status during pregnancy in third trimester           Medication Management: continue PNV, iron    Topics discussed: Prenatal care milestones  Glucose management  Iron supplementation  Kick counts and fetal movement  Labor signs and symptoms  Birth plan  Induction of labor - low dose pit  if not yet delivered   Tests next visit: none   Next visit: 6 week(s) for PP visit     Samantha Sharma MD  Obstetrics and Gynecology  [facility]

## 2025-02-21 NOTE — NON STRESS TEST
"Triage Note - Nursing Documentation  Labor and Delivery Admission Log    Bandar Jules  : 2000  MRN: [id number]  CSN: [id number]    Date in / Time in:  2025  Time in:     Date out / Time out:    Time out:     Nurse: Stephanie Mari RN    Patient Info: She is a 24 y.o. year old  at 38w5d with an ALEJANDRA of 3/1/2025, by Ultrasound who was seen on the [facility] Labor [facility].    Chief Complaint:   Chief Complaint   Patient presents with    Rupture of Membranes     \" I started leaking fluid at 5:30 pm.\"       Provider Instructions / Disposition: Pt arrived c/o leaking of fluid at 1730. Amnisure negative and RN noted no fluid on VE. Pt is 2/60/-3. Having irregular contractions, mild, with irritability and pt not feeling them. Pt reports pain is 2/10. Active fetal movement. Pt is GDM A1 and blood sugar was 122 after lunch today. Pt PO hydrated and educated on reasons to return to , pt understood. Pt has appt tomorrow,  will discuss IOL.     Patient Active Problem List   Diagnosis    Dermatographia    Skin tag of labia    GDM (gestational diabetes mellitus), class A1       NST Documentation (Only applicable > 32 weeks): Interpretation A  Nonstress Test Interpretation A: Reactive (25 2000 : Stephanie Mari, RN)    "

## 2025-02-23 ENCOUNTER — HOSPITAL ENCOUNTER (INPATIENT)
Facility: HOSPITAL | Age: 25
LOS: 3 days | Discharge: HOME | End: 2025-02-26
Attending: OBSTETRICS & GYNECOLOGY | Admitting: OBSTETRICS & GYNECOLOGY
Payer: COMMERCIAL

## 2025-02-23 ENCOUNTER — HOSPITAL ENCOUNTER (OUTPATIENT)
Dept: LABOR AND DELIVERY | Facility: HOSPITAL | Age: 25
Discharge: HOME | End: 2025-02-23
Payer: COMMERCIAL

## 2025-02-23 PROBLEM — Z34.90 PREGNANCY: Status: ACTIVE | Noted: 2025-02-23

## 2025-02-23 LAB
A1 MICROGLOB PLACENTAL VAG QL: POSITIVE
ABO GROUP BLD: NORMAL
ABO GROUP BLD: NORMAL
BLD GP AB SCN SERPL QL: NEGATIVE
DEPRECATED RDW RBC AUTO: 47.1 FL (ref 37–54)
ERYTHROCYTE [DISTWIDTH] IN BLOOD BY AUTOMATED COUNT: 14.4 % (ref 12.3–15.4)
HCT VFR BLD AUTO: 34.4 % (ref 34–46.6)
HGB BLD-MCNC: 11.4 G/DL (ref 12–15.9)
MCH RBC QN AUTO: 29.7 PG (ref 26.6–33)
MCHC RBC AUTO-ENTMCNC: 33.1 G/DL (ref 31.5–35.7)
MCV RBC AUTO: 89.6 FL (ref 79–97)
PLATELET # BLD AUTO: 155 10*3/MM3 (ref 140–450)
PMV BLD AUTO: 12.1 FL (ref 6–12)
RBC # BLD AUTO: 3.84 10*6/MM3 (ref 3.77–5.28)
RH BLD: NEGATIVE
RH BLD: NEGATIVE
T&S EXPIRATION DATE: NORMAL
WBC NRBC COR # BLD AUTO: 11.09 10*3/MM3 (ref 3.4–10.8)

## 2025-02-23 PROCEDURE — 86901 BLOOD TYPING SEROLOGIC RH(D): CPT | Performed by: STUDENT IN AN ORGANIZED HEALTH CARE EDUCATION/TRAINING PROGRAM

## 2025-02-23 PROCEDURE — 86900 BLOOD TYPING SEROLOGIC ABO: CPT | Performed by: STUDENT IN AN ORGANIZED HEALTH CARE EDUCATION/TRAINING PROGRAM

## 2025-02-23 PROCEDURE — 86850 RBC ANTIBODY SCREEN: CPT | Performed by: STUDENT IN AN ORGANIZED HEALTH CARE EDUCATION/TRAINING PROGRAM

## 2025-02-23 PROCEDURE — 86900 BLOOD TYPING SEROLOGIC ABO: CPT

## 2025-02-23 PROCEDURE — 86780 TREPONEMA PALLIDUM: CPT | Performed by: STUDENT IN AN ORGANIZED HEALTH CARE EDUCATION/TRAINING PROGRAM

## 2025-02-23 PROCEDURE — 84112 EVAL AMNIOTIC FLUID PROTEIN: CPT | Performed by: OBSTETRICS & GYNECOLOGY

## 2025-02-23 PROCEDURE — 85027 COMPLETE CBC AUTOMATED: CPT | Performed by: STUDENT IN AN ORGANIZED HEALTH CARE EDUCATION/TRAINING PROGRAM

## 2025-02-23 PROCEDURE — G0463 HOSPITAL OUTPT CLINIC VISIT: HCPCS

## 2025-02-23 PROCEDURE — 59025 FETAL NON-STRESS TEST: CPT

## 2025-02-23 PROCEDURE — 86901 BLOOD TYPING SEROLOGIC RH(D): CPT

## 2025-02-23 PROCEDURE — 25810000003 LACTATED RINGERS PER 1000 ML: Performed by: STUDENT IN AN ORGANIZED HEALTH CARE EDUCATION/TRAINING PROGRAM

## 2025-02-23 RX ORDER — SODIUM CHLORIDE 9 MG/ML
40 INJECTION, SOLUTION INTRAVENOUS AS NEEDED
Status: DISCONTINUED | OUTPATIENT
Start: 2025-02-23 | End: 2025-02-24 | Stop reason: HOSPADM

## 2025-02-23 RX ORDER — SODIUM CHLORIDE 0.9 % (FLUSH) 0.9 %
10 SYRINGE (ML) INJECTION EVERY 12 HOURS SCHEDULED
Status: DISCONTINUED | OUTPATIENT
Start: 2025-02-23 | End: 2025-02-24 | Stop reason: HOSPADM

## 2025-02-23 RX ORDER — TERBUTALINE SULFATE 1 MG/ML
0.25 INJECTION SUBCUTANEOUS AS NEEDED
Status: DISCONTINUED | OUTPATIENT
Start: 2025-02-23 | End: 2025-02-24 | Stop reason: HOSPADM

## 2025-02-23 RX ORDER — ACETAMINOPHEN 325 MG/1
650 TABLET ORAL EVERY 4 HOURS PRN
Status: DISCONTINUED | OUTPATIENT
Start: 2025-02-23 | End: 2025-02-24 | Stop reason: HOSPADM

## 2025-02-23 RX ORDER — PROMETHAZINE HYDROCHLORIDE 12.5 MG/1
12.5 SUPPOSITORY RECTAL EVERY 6 HOURS PRN
Status: DISCONTINUED | OUTPATIENT
Start: 2025-02-23 | End: 2025-02-24 | Stop reason: HOSPADM

## 2025-02-23 RX ORDER — MAGNESIUM CARB/ALUMINUM HYDROX 105-160MG
30 TABLET,CHEWABLE ORAL ONCE
Status: DISCONTINUED | OUTPATIENT
Start: 2025-02-23 | End: 2025-02-24 | Stop reason: HOSPADM

## 2025-02-23 RX ORDER — LIDOCAINE HYDROCHLORIDE 10 MG/ML
0.5 INJECTION, SOLUTION INFILTRATION; PERINEURAL ONCE AS NEEDED
Status: DISCONTINUED | OUTPATIENT
Start: 2025-02-23 | End: 2025-02-24 | Stop reason: HOSPADM

## 2025-02-23 RX ORDER — PROMETHAZINE HYDROCHLORIDE 12.5 MG/1
12.5 TABLET ORAL EVERY 6 HOURS PRN
Status: DISCONTINUED | OUTPATIENT
Start: 2025-02-23 | End: 2025-02-24 | Stop reason: HOSPADM

## 2025-02-23 RX ORDER — SODIUM CHLORIDE, SODIUM LACTATE, POTASSIUM CHLORIDE, CALCIUM CHLORIDE 600; 310; 30; 20 MG/100ML; MG/100ML; MG/100ML; MG/100ML
125 INJECTION, SOLUTION INTRAVENOUS CONTINUOUS
Status: DISCONTINUED | OUTPATIENT
Start: 2025-02-23 | End: 2025-02-24

## 2025-02-23 RX ORDER — OXYTOCIN/0.9 % SODIUM CHLORIDE 30/500 ML
2-20 PLASTIC BAG, INJECTION (ML) INTRAVENOUS
Status: DISCONTINUED | OUTPATIENT
Start: 2025-02-23 | End: 2025-02-24 | Stop reason: HOSPADM

## 2025-02-23 RX ORDER — SODIUM CHLORIDE 0.9 % (FLUSH) 0.9 %
10 SYRINGE (ML) INJECTION AS NEEDED
Status: DISCONTINUED | OUTPATIENT
Start: 2025-02-23 | End: 2025-02-24 | Stop reason: HOSPADM

## 2025-02-23 RX ADMIN — SODIUM CHLORIDE, POTASSIUM CHLORIDE, SODIUM LACTATE AND CALCIUM CHLORIDE 125 ML/HR: 600; 310; 30; 20 INJECTION, SOLUTION INTRAVENOUS at 20:54

## 2025-02-24 ENCOUNTER — ANESTHESIA EVENT (OUTPATIENT)
Dept: LABOR AND DELIVERY | Facility: HOSPITAL | Age: 25
End: 2025-02-24
Payer: COMMERCIAL

## 2025-02-24 ENCOUNTER — ANESTHESIA (OUTPATIENT)
Dept: LABOR AND DELIVERY | Facility: HOSPITAL | Age: 25
End: 2025-02-24
Payer: COMMERCIAL

## 2025-02-24 LAB
GLUCOSE BLDC GLUCOMTR-MCNC: 64 MG/DL (ref 70–130)
GLUCOSE BLDC GLUCOMTR-MCNC: 72 MG/DL (ref 70–130)
GLUCOSE BLDC GLUCOMTR-MCNC: 76 MG/DL (ref 70–130)
TREPONEMA PALLIDUM IGG+IGM AB [PRESENCE] IN SERUM OR PLASMA BY IMMUNOASSAY: NORMAL

## 2025-02-24 PROCEDURE — 0KQM0ZZ REPAIR PERINEUM MUSCLE, OPEN APPROACH: ICD-10-PCS | Performed by: OBSTETRICS & GYNECOLOGY

## 2025-02-24 PROCEDURE — C1755 CATHETER, INTRASPINAL: HCPCS | Performed by: NURSE ANESTHETIST, CERTIFIED REGISTERED

## 2025-02-24 PROCEDURE — 25810000003 LACTATED RINGERS PER 1000 ML: Performed by: STUDENT IN AN ORGANIZED HEALTH CARE EDUCATION/TRAINING PROGRAM

## 2025-02-24 PROCEDURE — 25810000003 LACTATED RINGERS SOLUTION: Performed by: STUDENT IN AN ORGANIZED HEALTH CARE EDUCATION/TRAINING PROGRAM

## 2025-02-24 PROCEDURE — 51703 INSERT BLADDER CATH COMPLEX: CPT

## 2025-02-24 PROCEDURE — 25810000003 LACTATED RINGERS SOLUTION: Performed by: NURSE ANESTHETIST, CERTIFIED REGISTERED

## 2025-02-24 PROCEDURE — 82948 REAGENT STRIP/BLOOD GLUCOSE: CPT

## 2025-02-24 RX ORDER — OXYTOCIN/0.9 % SODIUM CHLORIDE 30/500 ML
125 PLASTIC BAG, INJECTION (ML) INTRAVENOUS ONCE AS NEEDED
Status: DISCONTINUED | OUTPATIENT
Start: 2025-02-24 | End: 2025-02-26 | Stop reason: HOSPADM

## 2025-02-24 RX ORDER — CALCIUM CARBONATE 500 MG/1
2 TABLET, CHEWABLE ORAL 3 TIMES DAILY PRN
Status: DISCONTINUED | OUTPATIENT
Start: 2025-02-24 | End: 2025-02-26 | Stop reason: HOSPADM

## 2025-02-24 RX ORDER — OXYTOCIN/0.9 % SODIUM CHLORIDE 30/500 ML
999 PLASTIC BAG, INJECTION (ML) INTRAVENOUS ONCE
Status: COMPLETED | OUTPATIENT
Start: 2025-02-24 | End: 2025-02-24

## 2025-02-24 RX ORDER — DOCUSATE SODIUM 100 MG/1
100 CAPSULE, LIQUID FILLED ORAL 2 TIMES DAILY
Status: DISCONTINUED | OUTPATIENT
Start: 2025-02-24 | End: 2025-02-26 | Stop reason: HOSPADM

## 2025-02-24 RX ORDER — OXYTOCIN/0.9 % SODIUM CHLORIDE 30/500 ML
250 PLASTIC BAG, INJECTION (ML) INTRAVENOUS CONTINUOUS
Status: ACTIVE | OUTPATIENT
Start: 2025-02-24 | End: 2025-02-24

## 2025-02-24 RX ORDER — ACETAMINOPHEN 325 MG/1
650 TABLET ORAL EVERY 4 HOURS PRN
Status: DISCONTINUED | OUTPATIENT
Start: 2025-02-24 | End: 2025-02-24 | Stop reason: HOSPADM

## 2025-02-24 RX ORDER — BISACODYL 10 MG
10 SUPPOSITORY, RECTAL RECTAL DAILY PRN
Status: DISCONTINUED | OUTPATIENT
Start: 2025-02-25 | End: 2025-02-26 | Stop reason: HOSPADM

## 2025-02-24 RX ORDER — SODIUM CHLORIDE 0.9 % (FLUSH) 0.9 %
1-10 SYRINGE (ML) INJECTION AS NEEDED
Status: DISCONTINUED | OUTPATIENT
Start: 2025-02-24 | End: 2025-02-26 | Stop reason: HOSPADM

## 2025-02-24 RX ORDER — HYDROCODONE BITARTRATE AND ACETAMINOPHEN 10; 325 MG/1; MG/1
1 TABLET ORAL EVERY 4 HOURS PRN
Status: DISCONTINUED | OUTPATIENT
Start: 2025-02-24 | End: 2025-02-26 | Stop reason: HOSPADM

## 2025-02-24 RX ORDER — METHYLERGONOVINE MALEATE 0.2 MG/ML
200 INJECTION INTRAVENOUS ONCE AS NEEDED
Status: DISCONTINUED | OUTPATIENT
Start: 2025-02-24 | End: 2025-02-24 | Stop reason: HOSPADM

## 2025-02-24 RX ORDER — MORPHINE SULFATE 2 MG/ML
2 INJECTION, SOLUTION INTRAMUSCULAR; INTRAVENOUS
Status: DISCONTINUED | OUTPATIENT
Start: 2025-02-24 | End: 2025-02-24 | Stop reason: HOSPADM

## 2025-02-24 RX ORDER — ACETAMINOPHEN 325 MG/1
650 TABLET ORAL EVERY 6 HOURS PRN
Status: DISCONTINUED | OUTPATIENT
Start: 2025-02-24 | End: 2025-02-26 | Stop reason: HOSPADM

## 2025-02-24 RX ORDER — ONDANSETRON 4 MG/1
4 TABLET, ORALLY DISINTEGRATING ORAL EVERY 8 HOURS PRN
Status: DISCONTINUED | OUTPATIENT
Start: 2025-02-24 | End: 2025-02-26 | Stop reason: HOSPADM

## 2025-02-24 RX ORDER — PRENATAL VIT/IRON FUM/FOLIC AC 27MG-0.8MG
1 TABLET ORAL DAILY
Status: DISCONTINUED | OUTPATIENT
Start: 2025-02-25 | End: 2025-02-26 | Stop reason: HOSPADM

## 2025-02-24 RX ORDER — PROMETHAZINE HYDROCHLORIDE 12.5 MG/1
12.5 TABLET ORAL EVERY 6 HOURS PRN
Status: DISCONTINUED | OUTPATIENT
Start: 2025-02-24 | End: 2025-02-24 | Stop reason: HOSPADM

## 2025-02-24 RX ORDER — ONDANSETRON 4 MG/1
4 TABLET, ORALLY DISINTEGRATING ORAL EVERY 6 HOURS PRN
Status: DISCONTINUED | OUTPATIENT
Start: 2025-02-24 | End: 2025-02-24 | Stop reason: HOSPADM

## 2025-02-24 RX ORDER — IBUPROFEN 600 MG/1
600 TABLET, FILM COATED ORAL EVERY 6 HOURS PRN
Status: DISCONTINUED | OUTPATIENT
Start: 2025-02-24 | End: 2025-02-24 | Stop reason: HOSPADM

## 2025-02-24 RX ORDER — HYDROCORTISONE 25 MG/G
1 CREAM TOPICAL AS NEEDED
Status: DISCONTINUED | OUTPATIENT
Start: 2025-02-24 | End: 2025-02-26 | Stop reason: HOSPADM

## 2025-02-24 RX ORDER — DIPHENHYDRAMINE HCL 25 MG
25 CAPSULE ORAL NIGHTLY PRN
Status: DISCONTINUED | OUTPATIENT
Start: 2025-02-24 | End: 2025-02-26 | Stop reason: HOSPADM

## 2025-02-24 RX ORDER — EPHEDRINE SULFATE 5 MG/ML
10 INJECTION INTRAVENOUS
Status: DISCONTINUED | OUTPATIENT
Start: 2025-02-24 | End: 2025-02-24 | Stop reason: HOSPADM

## 2025-02-24 RX ORDER — MISOPROSTOL 200 UG/1
800 TABLET ORAL AS NEEDED
Status: DISCONTINUED | OUTPATIENT
Start: 2025-02-24 | End: 2025-02-24 | Stop reason: HOSPADM

## 2025-02-24 RX ORDER — IBUPROFEN 600 MG/1
600 TABLET, FILM COATED ORAL EVERY 6 HOURS PRN
Status: DISCONTINUED | OUTPATIENT
Start: 2025-02-24 | End: 2025-02-26 | Stop reason: HOSPADM

## 2025-02-24 RX ORDER — PROMETHAZINE HYDROCHLORIDE 12.5 MG/1
12.5 SUPPOSITORY RECTAL EVERY 6 HOURS PRN
Status: DISCONTINUED | OUTPATIENT
Start: 2025-02-24 | End: 2025-02-24 | Stop reason: HOSPADM

## 2025-02-24 RX ORDER — CARBOPROST TROMETHAMINE 250 UG/ML
250 INJECTION, SOLUTION INTRAMUSCULAR AS NEEDED
Status: DISCONTINUED | OUTPATIENT
Start: 2025-02-24 | End: 2025-02-24 | Stop reason: HOSPADM

## 2025-02-24 RX ORDER — HYDROCODONE BITARTRATE AND ACETAMINOPHEN 5; 325 MG/1; MG/1
1 TABLET ORAL EVERY 4 HOURS PRN
Status: DISCONTINUED | OUTPATIENT
Start: 2025-02-24 | End: 2025-02-26 | Stop reason: HOSPADM

## 2025-02-24 RX ORDER — ONDANSETRON 2 MG/ML
4 INJECTION INTRAMUSCULAR; INTRAVENOUS EVERY 6 HOURS PRN
Status: DISCONTINUED | OUTPATIENT
Start: 2025-02-24 | End: 2025-02-24 | Stop reason: HOSPADM

## 2025-02-24 RX ADMIN — SODIUM CHLORIDE, POTASSIUM CHLORIDE, SODIUM LACTATE AND CALCIUM CHLORIDE 125 ML/HR: 600; 310; 30; 20 INJECTION, SOLUTION INTRAVENOUS at 02:17

## 2025-02-24 RX ADMIN — IBUPROFEN 600 MG: 600 TABLET ORAL at 19:52

## 2025-02-24 RX ADMIN — Medication 1 MILLI-UNITS/MIN: at 02:22

## 2025-02-24 RX ADMIN — Medication 250 ML/HR: at 14:56

## 2025-02-24 RX ADMIN — SODIUM CHLORIDE, POTASSIUM CHLORIDE, SODIUM LACTATE AND CALCIUM CHLORIDE 1000 ML: 600; 310; 30; 20 INJECTION, SOLUTION INTRAVENOUS at 08:01

## 2025-02-24 RX ADMIN — DOCUSATE SODIUM 100 MG: 100 CAPSULE, LIQUID FILLED ORAL at 19:53

## 2025-02-24 RX ADMIN — BENZOCAINE AND LEVOMENTHOL: 200; 5 SPRAY TOPICAL at 18:38

## 2025-02-24 RX ADMIN — ACETAMINOPHEN 650 MG: 325 TABLET, FILM COATED ORAL at 16:21

## 2025-02-24 RX ADMIN — Medication 999 ML/HR: at 14:41

## 2025-02-24 RX ADMIN — SODIUM CHLORIDE, SODIUM LACTATE, POTASSIUM CHLORIDE, CALCIUM CHLORIDE 1000 ML: 20; 30; 600; 310 INJECTION, SOLUTION INTRAVENOUS at 15:20

## 2025-02-24 NOTE — NURSING NOTE
Pt and FOB educated on shoulder dystocia and maneuvers that may be used in the event of shoulder dystocia with delivery. Pt and FOB verbalized understanding. No questions at this time.

## 2025-02-24 NOTE — NON STRESS TEST
Bandar Jules, a  at 39w1d with an ALEJANDRA of 3/1/2025, by Ultrasound, was seen at [facility] for a nonstress test.    Chief Complaint   Patient presents with    Scheduled Induction     Pt arrived for an elective induction. Pt reports SROM at 1900 this evening.       Patient Active Problem List   Diagnosis    Dermatographia    Skin tag of labia    GDM (gestational diabetes mellitus), class A1    Pregnancy       Start Time:   Stop Time:     Interpretation A  Nonstress Test Interpretation A: Reactive

## 2025-02-24 NOTE — PLAN OF CARE
Goal Outcome Evaluation:         Vacuum assist delivery. Viable male infant delivered at 1437. Partial third degree tear. Vaginal packing inserted and knight cath anchored. To be removed at 0500 2/25/2025. Pt has no c/o pain at this time. Ambulated to bathroom without difficulty. Stable to transfer to postpartum. Report given to Tiffany SULLIVAN RN

## 2025-02-24 NOTE — L&D DELIVERY NOTE
..[facility]  Vaginal Delivery Note    Delivery details     Delivery: Vaginal, Vacuum (Extractor)    YOB: 2026   Time of Birth: 2:37 PM     Anesthesia:  Epidural   Delivering clinician:  Jono   Forceps?   No   Vacuum? Yes  Vacuum Delivery  Vacuum attempted?      Vacuum indication:     Vacuum type: bell cup   Application location:     First Attempt     Time applied:     Time removed:     Second Attempt    Time applied:     Time removed:     Third Attempt    Time applied:     Time removed:     Number of pulls:     Number of pop-offs: 1   Low-end pressure range:     High-end pressure range:     Total application time:     Applied by:     Failed?         Shoulder dystocia present: No      Delivery narrative: 24-year-old G1 presented at 39 weeks and 1 day for induction of labor.  Shortly before presentation patient actually had rupture of membranes.  She had low-dose Pitocin overnight.  When I saw her at 0700 hrs. in the morning she was found to be 3 to 4 cm 80% effaced.  Forebag was ruptured.  Pitocin augmentation was continued.  She reached complete at approximately 1230.  Patient pushed for approximately 2 hours.  After hour and a half I was called to bedside for increased edema and swelling of the perineum and for assessment.  I found the fetus to be at a +3 station but there was not significant movement with several rounds of contractions.  Decision was made to use a vacuum.  Risks benefits and alternatives discussed.  Red rubber catheter was used to empty the bladder.  Positioning was ascertained.  Vacuum was placed after checking for no entrapment it was taken up with the next round of contractions.  Nursing recorded to pop-offs however this was due to digital manipulation of the Interface with my hand.  With the next round of contractions the infant was delivered easily in NEO position.  Upon delivery of the head the Was released and the remainder the delivery was uneventful.  The  oronasopharynx was bulb suctioned at the mother's perineum and the infant was placed on the mother's abdomen.  Cord was doubly clamped and cut.  Cord blood sample was collected.  Placenta was spontaneously delivered intact less than 2 minutes later.  Partial third-degree laceration was repaired with 3-0 Vicryl suture with interrupted crowned suture x 4 followed by deep subcu cutaneous interrupted suture with 3-0 Vicryl followed by running continuous 3-0 Vicryl superficially.  Patient had skin marks that were slightly oozy and not amendable to suture repair and for this reason a Betadine soaked vaginal pack was placed remaining until 05 100.  Overall there were no complications.  Uterine tone was firm to level umbilicus.    Infant details    Findings: male infant     Infant observations: Weight: 3657 g (8 lb 1 oz)  Length: 19.75 in   Apgars: 8  @ 1 minute /    9  @ 5 minutes     Placenta, Cord, and Fluid    Placenta delivered  Spontaneous at   2/24/2025  2:41 PM    Cord: 3 vessels present.   Nuchal Cord?  no   Cord blood obtained: Yes     Repair    Episiotomy: None   Lacerations: Yes  Laceration Information  Laceration Repaired?   Perineal: 3rd Yes   Periurethral:       Labial:       Sulcus:       Vaginal:       Cervical:         Suture used for repair: 3-0 Vicryl  Laceration Length for 3rd or 4th degree lacerations: 2 cm cm   Estimated Blood Loss:  <350           Complications  none    Disposition  Mother to Mother Baby/Postpartum  in stable condition currently.  Baby to NBN  in stable condition currently.      Lester De La Cruz MD  02/24/25  16:21 EST

## 2025-02-24 NOTE — H&P
Subjective   Chief Complaint   Patient presents with    Scheduled Induction     Pt arrived for an elective induction. Pt reports SROM at 1900 this evening.     Bandar Jules is a 24 y.o. year old .  No LMP recorded. Patient is pregnant.  She presents to be seen because of induction of labor 39 weeks 2 days.  Pregnancy course here to for benign.  GBS negative..     OTHER COMPLAINTS:  Nothing else    The following portions of the patient's history were reviewed and updated as appropriate:She  has a past medical history of Allergic (1999), GDM (gestational diabetes mellitus), class A1 (2024), Headache, PMS (premenstrual syndrome) (), Pregnancy (2025), Sensitive skin, and Urinary tract infection ().  She does not have any pertinent problems on file.  She  has a past surgical history that includes Ear Tubes Removal () and Abercrombie tooth extraction (?).  Her family history includes Basal cell carcinoma in her father; Breast cancer in her maternal grandmother; Cancer in her maternal aunt and maternal grandmother; Diabetes in her paternal grandfather and paternal uncle; Lung cancer in her paternal grandfather; Miscarriages / Stillbirths in her mother; Ovarian cancer in her maternal aunt.  She  reports that she has never smoked. She has never been exposed to tobacco smoke. She has never used smokeless tobacco. She reports that she does not currently use alcohol. She reports that she does not use drugs.  Current Facility-Administered Medications   Medication Dose Route Frequency Provider Last Rate Last Admin    acetaminophen (TYLENOL) tablet 650 mg  650 mg Oral Q4H PRN Samantha Sharma MD        acetaminophen (TYLENOL) tablet 650 mg  650 mg Oral Q4H PRN Samantha Sharma MD        carboprost (HEMABATE) injection 250 mcg  250 mcg Intramuscular PRN Samantha Sharma MD        ePHEDrine Sulfate (Pressors) 5 MG/ML injection 10 mg  10 mg Intravenous Q10 Min PRN Gregorio Jaramillo CRNA         fentaNYL 2mcg/mL and ropivacaine 0.2% in NS epidural 100 mL  12 mL/hr Epidural Continuous Gregorio Jaramillo CRNA        ibuprofen (ADVIL,MOTRIN) tablet 600 mg  600 mg Oral Q6H PRN Samantha Sharma MD        lactated ringers infusion  125 mL/hr Intravenous Continuous Samantha Sharma  mL/hr at 02/24/25 1250 125 mL/hr at 02/24/25 1250    lidocaine (XYLOCAINE) 1 % injection 0.5 mL  0.5 mL Intradermal Once PRN Samantha Shamra MD        methylergonovine (METHERGINE) injection 200 mcg  200 mcg Intramuscular Once PRN Samantha Sharma MD        mineral oil liquid 30 mL  30 mL Topical Once Samantha Sharma MD        miSOPROStol (CYTOTEC) tablet 800 mcg  800 mcg Rectal PRN Samantha Sharma MD        morphine injection 4 mg  4 mg Intravenous Q2H PRN Samantha Sharma MD        morphine injection 6 mg  6 mg Intravenous Q3H PRN Samantha Sharma MD        Morphine sulfate (PF) injection 2 mg  2 mg Intravenous Q2H PRN Samantha Sharma MD        ondansetron ODT (ZOFRAN-ODT) disintegrating tablet 4 mg  4 mg Oral Q6H PRN Samantha Sharma MD        Or    ondansetron (ZOFRAN) injection 4 mg  4 mg Intravenous Q6H PRN Samantha Sharma MD        oxytocin (PITOCIN) 30 units in 0.9% sodium chloride 500 mL (premix)  2-20 trung-units/min Intravenous Titrated Lester De La Cruz MD 22 mL/hr at 02/24/25 1040 22 trung-units/min at 02/24/25 1040    promethazine (PHENERGAN) suppository 12.5 mg  12.5 mg Rectal Q6H PRN Samantha Sharma MD        Or    promethazine (PHENERGAN) tablet 12.5 mg  12.5 mg Oral Q6H PRN Samantha Sharma MD        promethazine (PHENERGAN) suppository 12.5 mg  12.5 mg Rectal Q6H PRN Samantha Sharma MD        Or    promethazine (PHENERGAN) tablet 12.5 mg  12.5 mg Oral Q6H PRN Samantha Sharma MD        sodium chloride 0.9 % flush 10 mL  10 mL Intravenous Q12H Samantha Sharma MD        sodium chloride 0.9 % flush 10 mL  10 mL Intravenous PRN Samantha Sharma MD        sodium chloride 0.9 %  "infusion 40 mL  40 mL Intravenous PRN Samantha Sharma MD        terbutaline (BRETHINE) injection 0.25 mg  0.25 mg Subcutaneous PRN Samantha Sharma MD         No current facility-administered medications on file prior to encounter.     Current Outpatient Medications on File Prior to Encounter   Medication Sig    Alcohol Swabs (Alcohol Prep) pads Use 1 each 4 (Four) Times a Day.    ferrous sulfate 325 (65 Fe) MG tablet     glucose blood test strip Use to check fasting blood sugar and check 1 hour after meals, up to 4 times daily    glucose monitor monitoring kit Use 1 each As Needed (Fasting and 1 hour after meals (4 times daily)).    Lancets Thin misc Use to test blood fasting blood sugar and 1 hour after meals up to 4 times daily    ondansetron ODT (ZOFRAN-ODT) 4 MG disintegrating tablet Place 1 tablet on the tongue Every 8 (Eight) Hours As Needed for Vomiting or Nausea.    Prenatal Multivit-Min-Fe-FA (PRENATAL 1 + IRON PO)      She is allergic to latex.    Social History    Tobacco Use      Smoking status: Never        Passive exposure: Never      Smokeless tobacco: Never    Review of Systems  Consitutional POS: nothing reported    NEG: anorexia or night sweats   Gastointestinal POS: nothing reported    NEG: bloating, change in bowel habits, melena, or reflux symptoms   Genitourinary POS: nothing reported    NEG: dysuria or hematuria   Integument POS: nothing reported    NEG: moles that are changing in size, shape, color or rashes   Breast POS: nothing reported    NEG: persistent breast lump, skin dimpling, or nipple discharge         Respiratory: negative  Cardiovascular: negative  GYN:  negative          Objective   BP 91/60   Pulse 114   Temp 97.8 °F (36.6 °C) (Oral)   Resp 16   Ht 157.5 cm (62.01\")   Wt 94.8 kg (209 lb)   SpO2 100%   Breastfeeding Yes   BMI 38.22 kg/m²     General:  well developed; well nourished  no acute distress  mentation appropriate   Skin:  No suspicious lesions seen "   Thyroid: normal to inspection and palpation   Lungs:  breathing is unlabored  clear to auscultation bilaterally   Heart:  regular rate and rhythm, S1, S2 normal, no murmur, click, rub or gallop   Breasts:  Not performed.   Abdomen: soft, non-tender; no masses  no umbilical or inguinal hernias are present  no hepato-splenomegaly   Pelvis: Clinical staff was present for exam  External genitalia:  normal appearance of the external genitalia including Bartholin's and Vanoss's glands.     Psychiatric: Alert and oriented ×3, mood and affect appropriate  HEENT: Atraumatic, normocephalic, normal scleral icterus  Extremities: 2+ pulses bilaterally, no edema      Lab Review   CBC    Imaging   Pelvic ultrasound report        Assessment   Intrauterine pregnancy 39 weeks 2 days  Induction of labor  GBS negative  A1 gestational diabetes  Fetal status category 1     Plan   Admit.  Patient came in had spontaneous rupture membranes without significant labor at 1900 hrs. the evening before.  She was started on low-dose Pitocin.  Forebag was ruptured by me.  Fetal status reassuring.  4 cm 80% effaced.  Epidural per patient request.  Blood sugar checks every 4 hours.      New Medications Ordered This Visit   Medications    sodium chloride 0.9 % flush 10 mL    sodium chloride 0.9 % flush 10 mL    sodium chloride 0.9 % infusion 40 mL    lidocaine (XYLOCAINE) 1 % injection 0.5 mL    lactated ringers bolus 1,000 mL    lactated ringers infusion    acetaminophen (TYLENOL) tablet 650 mg    morphine injection 4 mg    morphine injection 6 mg    OR Linked Order Group     promethazine (PHENERGAN) suppository 12.5 mg     promethazine (PHENERGAN) tablet 12.5 mg    mineral oil liquid 30 mL    oxytocin (PITOCIN) 30 units in 0.9% sodium chloride 500 mL (premix)    terbutaline (BRETHINE) injection 0.25 mg    lactated ringers bolus 1,000 mL    ePHEDrine Sulfate (Pressors) 5 MG/ML injection 10 mg    fentaNYL 2mcg/mL and ropivacaine 0.2% in NS epidural  100 mL    FOLLOWED BY Linked Order Group     oxytocin (PITOCIN) 30 units in 0.9% sodium chloride 500 mL (premix)     oxytocin (PITOCIN) 30 units in 0.9% sodium chloride 500 mL (premix)    acetaminophen (TYLENOL) tablet 650 mg    ibuprofen (ADVIL,MOTRIN) tablet 600 mg    Morphine sulfate (PF) injection 2 mg    methylergonovine (METHERGINE) injection 200 mcg    carboprost (HEMABATE) injection 250 mcg    miSOPROStol (CYTOTEC) tablet 800 mcg    OR Linked Order Group     ondansetron ODT (ZOFRAN-ODT) disintegrating tablet 4 mg     ondansetron (ZOFRAN) injection 4 mg    OR Linked Order Group     promethazine (PHENERGAN) suppository 12.5 mg     promethazine (PHENERGAN) tablet 12.5 mg          This note was electronically signed.      February 24, 2025

## 2025-02-24 NOTE — PLAN OF CARE
Goal Outcome Evaluation:  Plan of Care Reviewed With: patient        Progress: improving  Outcome Evaluation: VSS, adequate I/O, EFM tracing reassuring, and no s\s of infection. FOB remains at bedside. Pitocin infusing as ordered. Anticipate delivery later today. Will continue with current plan of care.

## 2025-02-24 NOTE — ANESTHESIA PROCEDURE NOTES
Labor Epidural    Pre-sedation assessment completed: 2025 8:43 AM    Patient reassessed immediately prior to procedure    Patient location during procedure: OB  Start Time: 2025 8:43 AM  Stop Time: 2025 8:43 AM  Indication:at surgeon's request  Performed By  CRNA/CAA: Kaushik Dalal CRNA  Preanesthetic Checklist  Completed: patient identified, IV checked, site marked, risks and benefits discussed, surgical consent, monitors and equipment checked, pre-op evaluation and timeout performed  Additional Notes  Risks of epidural analgesia discussed , including but not limited to:  Headache, itching, nausea and vomiting, infection, failure, decreased BP, decreased fetal heart rate, possible . Permanent chronic back pain, nerve damage, paralysis, etc    Skin localized with lidocaine skin wheal.  Test dose 4 ml of 1.5% lidocaine with 1:200,000 Epinephrine - Negative    Bolus dose of 10 ml Fentanyl/Ropivacaine solution given and epidural infusion started.  Prep:  Pt Position:sitting  Sterile Tech:cap, gloves, mask and sterile barrier  Prep:chlorhexidine gluconate and isopropyl alcohol  Monitoring:blood pressure monitoring and continuous pulse oximetry  Epidural Block Procedure:  Approach:midline  Guidance:landmark technique and palpation technique  Location:L3-L4  Needle Type:Tuohy  Needle Gauge:18 G  Loss of Resistance Medium: saline  Loss of Resistance: 5cm  Cath Depth at skin:11 cm  Paresthesia: none  Aspiration:negative  Test Dose:negative  Number of Attempts: 1  Post Assessment:  Dressing:occlusive dressing applied and secured with tape  Pt Tolerance:patient tolerated the procedure well with no apparent complications  Complications:no

## 2025-02-24 NOTE — ANESTHESIA PREPROCEDURE EVALUATION
Anesthesia Evaluation     Patient summary reviewed and Nursing notes reviewed   no history of anesthetic complications:   NPO Solid Status: > 8 hours  NPO Liquid Status: > 8 hours           Airway   Mallampati: II  TM distance: >3 FB  Neck ROM: full  no difficulty expected and Possible difficult intubation  Dental - normal exam     Pulmonary - negative pulmonary ROS and normal exam   Cardiovascular - negative cardio ROS and normal exam        Neuro/Psych  (+) headaches  GI/Hepatic/Renal/Endo    (+) diabetes mellitus    Musculoskeletal (-) negative ROS    Abdominal  - normal exam   Substance History - negative use     OB/GYN    (+) Pregnant        Other - negative ROS                   Anesthesia Plan    ASA 2 - emergent     epidural     (Risks of epidural analgesia discussed, including but not limited to:  Headache, itching, n&v, infection, failure, decreased bp, decreased fetal hr, possible c/s. Permanent chronic back pain, nerve damage, paralysis, etc.    Patient told that epidural analgesia may not relieve all the labor pain, that she may still feel pressure and that she may still feel pain as the baby is close to delivery.     All questions answered and informed consent obtained.    )    Anesthetic plan, risks, benefits, and alternatives have been provided, discussed and informed consent has been obtained with: patient.    CODE STATUS:    Level Of Support Discussed With: Patient  Code Status (Patient has no pulse and is not breathing): CPR (Attempt to Resuscitate)  Medical Interventions (Patient has pulse or is breathing): Full Support

## 2025-02-24 NOTE — NURSING NOTE
Called Dr Garcias to notify her that pt is here for IOL. Notified SROM at 1900 per pt and that amnisure is positive. New orders received for Oxytocin low dose protocol to start at 0200 and regular protocol at 0500 if pt does not start active labor/regular contractions on her own. Order received for epidural prn when pt desires.

## 2025-02-25 PROBLEM — Z37.9 VACUUM-ASSISTED VAGINAL DELIVERY: Status: ACTIVE | Noted: 2025-02-25

## 2025-02-25 LAB
ABO GROUP BLD: NORMAL
BASOPHILS # BLD AUTO: 0.03 10*3/MM3 (ref 0–0.2)
BASOPHILS NFR BLD AUTO: 0.2 % (ref 0–1.5)
DEPRECATED RDW RBC AUTO: 46.5 FL (ref 37–54)
EOSINOPHIL # BLD AUTO: 0.03 10*3/MM3 (ref 0–0.4)
EOSINOPHIL NFR BLD AUTO: 0.2 % (ref 0.3–6.2)
ERYTHROCYTE [DISTWIDTH] IN BLOOD BY AUTOMATED COUNT: 14.3 % (ref 12.3–15.4)
FETAL BLEED: POSITIVE
GLUCOSE BLDC GLUCOMTR-MCNC: 95 MG/DL (ref 70–130)
GLUCOSE P FAST SERPL-MCNC: 97 MG/DL (ref 74–106)
HCT VFR BLD AUTO: 27.8 % (ref 34–46.6)
HGB BLD-MCNC: 9.2 G/DL (ref 12–15.9)
HGB F BLD QL KLEIH BETKE: NORMAL
IMM GRANULOCYTES # BLD AUTO: 0.13 10*3/MM3 (ref 0–0.05)
IMM GRANULOCYTES NFR BLD AUTO: 0.8 % (ref 0–0.5)
LYMPHOCYTES # BLD AUTO: 2.69 10*3/MM3 (ref 0.7–3.1)
LYMPHOCYTES NFR BLD AUTO: 15.9 % (ref 19.6–45.3)
MCH RBC QN AUTO: 29.6 PG (ref 26.6–33)
MCHC RBC AUTO-ENTMCNC: 33.1 G/DL (ref 31.5–35.7)
MCV RBC AUTO: 89.4 FL (ref 79–97)
MONOCYTES # BLD AUTO: 1.42 10*3/MM3 (ref 0.1–0.9)
MONOCYTES NFR BLD AUTO: 8.4 % (ref 5–12)
NEUTROPHILS NFR BLD AUTO: 12.66 10*3/MM3 (ref 1.7–7)
NEUTROPHILS NFR BLD AUTO: 74.5 % (ref 42.7–76)
NRBC BLD AUTO-RTO: 0 /100 WBC (ref 0–0.2)
NUMBER OF DOSES: NORMAL
PLATELET # BLD AUTO: 148 10*3/MM3 (ref 140–450)
PMV BLD AUTO: 12.3 FL (ref 6–12)
RBC # BLD AUTO: 3.11 10*6/MM3 (ref 3.77–5.28)
RH BLD: NEGATIVE
WBC NRBC COR # BLD AUTO: 16.96 10*3/MM3 (ref 3.4–10.8)

## 2025-02-25 PROCEDURE — 90715 TDAP VACCINE 7 YRS/> IM: CPT | Performed by: OBSTETRICS & GYNECOLOGY

## 2025-02-25 PROCEDURE — 90472 IMMUNIZATION ADMIN EACH ADD: CPT | Performed by: OBSTETRICS & GYNECOLOGY

## 2025-02-25 PROCEDURE — 90471 IMMUNIZATION ADMIN: CPT | Performed by: OBSTETRICS & GYNECOLOGY

## 2025-02-25 PROCEDURE — 25010000002 TETANUS-DIPHTH-ACELL PERTUSSIS 5-2.5-18.5 LF-MCG/0.5 SUSPENSION PREFILLED SYRINGE: Performed by: OBSTETRICS & GYNECOLOGY

## 2025-02-25 PROCEDURE — 86900 BLOOD TYPING SEROLOGIC ABO: CPT | Performed by: OBSTETRICS & GYNECOLOGY

## 2025-02-25 PROCEDURE — 86901 BLOOD TYPING SEROLOGIC RH(D): CPT | Performed by: OBSTETRICS & GYNECOLOGY

## 2025-02-25 PROCEDURE — 85460 HEMOGLOBIN FETAL: CPT | Performed by: OBSTETRICS & GYNECOLOGY

## 2025-02-25 PROCEDURE — 85461 HEMOGLOBIN FETAL: CPT | Performed by: OBSTETRICS & GYNECOLOGY

## 2025-02-25 PROCEDURE — 85025 COMPLETE CBC W/AUTO DIFF WBC: CPT | Performed by: OBSTETRICS & GYNECOLOGY

## 2025-02-25 PROCEDURE — 90707 MMR VACCINE SC: CPT | Performed by: OBSTETRICS & GYNECOLOGY

## 2025-02-25 PROCEDURE — 82948 REAGENT STRIP/BLOOD GLUCOSE: CPT

## 2025-02-25 PROCEDURE — 25010000002 RHO D IMMUNE GLOBULIN 1500 UNIT/2ML SOLUTION PREFILLED SYRINGE: Performed by: OBSTETRICS & GYNECOLOGY

## 2025-02-25 PROCEDURE — 25010000002 MEASLES, MUMPS & RUBELLA VAC RECONSTITUTED SOLUTION: Performed by: OBSTETRICS & GYNECOLOGY

## 2025-02-25 PROCEDURE — 82947 ASSAY GLUCOSE BLOOD QUANT: CPT | Performed by: OBSTETRICS & GYNECOLOGY

## 2025-02-25 RX ORDER — FERROUS SULFATE 324(65)MG
324 TABLET, DELAYED RELEASE (ENTERIC COATED) ORAL 2 TIMES DAILY WITH MEALS
Status: DISCONTINUED | OUTPATIENT
Start: 2025-02-25 | End: 2025-02-26 | Stop reason: HOSPADM

## 2025-02-25 RX ADMIN — DOCUSATE SODIUM 100 MG: 100 CAPSULE, LIQUID FILLED ORAL at 20:23

## 2025-02-25 RX ADMIN — PRENATAL VITAMINS-IRON FUMARATE 27 MG IRON-FOLIC ACID 0.8 MG TABLET 1 TABLET: at 08:29

## 2025-02-25 RX ADMIN — FERROUS SULFATE TAB EC 324 MG (65 MG FE EQUIVALENT) 324 MG: 324 (65 FE) TABLET DELAYED RESPONSE at 18:18

## 2025-02-25 RX ADMIN — MEASLES, MUMPS, AND RUBELLA VIRUS VACCINE LIVE 0.5 ML: 1000; 12500; 1000 INJECTION, POWDER, LYOPHILIZED, FOR SUSPENSION SUBCUTANEOUS at 21:12

## 2025-02-25 RX ADMIN — DOCUSATE SODIUM 100 MG: 100 CAPSULE, LIQUID FILLED ORAL at 08:29

## 2025-02-25 RX ADMIN — ACETAMINOPHEN 650 MG: 325 TABLET, FILM COATED ORAL at 20:23

## 2025-02-25 RX ADMIN — HUMAN RHO(D) IMMUNE GLOBULIN 1500 UNITS: 1500 SOLUTION INTRAMUSCULAR; INTRAVENOUS at 21:11

## 2025-02-25 RX ADMIN — HYDROCODONE BITARTRATE AND ACETAMINOPHEN 1 TABLET: 5; 325 TABLET ORAL at 05:13

## 2025-02-25 RX ADMIN — ACETAMINOPHEN 650 MG: 325 TABLET, FILM COATED ORAL at 11:52

## 2025-02-25 RX ADMIN — TETANUS TOXOID, REDUCED DIPHTHERIA TOXOID AND ACELLULAR PERTUSSIS VACCINE, ADSORBED 0.5 ML: 5; 2.5; 8; 8; 2.5 SUSPENSION INTRAMUSCULAR at 17:00

## 2025-02-25 RX ADMIN — IBUPROFEN 600 MG: 600 TABLET ORAL at 18:18

## 2025-02-25 NOTE — PLAN OF CARE
Goal Outcome Evaluation:  Plan of Care Reviewed With: patient        Progress: improving  Outcome Evaluation: VSS, Adequate I/O, Ambulating in room, Pain being managed by PO meds, IV taken out, COntinue with plan of care.

## 2025-02-25 NOTE — LACTATION NOTE
Lactation Consult Note    Evaluation Completed: 2025 17:14 EST  Patient Name: Bandar Jules  :  2000  MRN:  [id number]     REFERRAL  INFORMATION:                          Date of Referral: 25   Person Making Referral: lactation consultant  Maternal Reason for Referral: no prior breastfeeding experience  Infant Reason for Referral:  ()    DELIVERY HISTORY:,        Skin to skin initiation date/time: 2025 2:37 PM  Skin to skin end date/time: 2025 3:50 PM       MATERNAL ASSESSMENT:  Breast Size Issue: none (25 1600)  Breast Shape: Bilateral:, round (25 1600)  Breast Density: Bilateral:, filling, full, soft (25 1600)  Areola: Bilateral: (25 1600)  Nipples: Bilateral:, everted, short, graspable (25 1600)     Left Nipple Symptoms: intact, nontender (25 1600)  Right Nipple Symptoms: intact, nontender (25 1600)  Breast Signs/Symptoms of Infection:  (none) (25 1600)    Bandar Jules 24-year-old first-time mom desiring to breast-feed.  Bandar attended our 101breast-feeding class a month ago.  And is prepared to breast-feed.  She had a hard delivery and pushed for 2 hours before baby was delivered.  She also ended up with a third-degree repaired.    INFANT ASSESSMENT:  Information for the patient's :  Klaus Jules [[id number]]   No past medical history on file.      Baby boy delivered at 39 weeks and 2 days gestation.  He was exhausted after birth so did not latch on that way.  But did latch on later on in cluster fed most of the night.  He got circumcised this morning and has been sleepy most of the day.     MATERNAL INFANT FEEDING:     Maternal Emotional State: receptive (25 1600)  Infant Positioning: cross-cradle (25 1600)   Signs of Milk Transfer: deep jaw excursions noted, suck/swallow ratio (25 1600)  Pain with Feeding: no (25 1600)  Nipple Shape After Feeding, Left Breast: appropriately  projected (02/24/25 1600)  Nipple Shape After Feeding, Right: appropriately projected (02/24/25 1600)  Latch Assistance: full assistance needed (02/24/25 1600)     Once baby boy woke up he was very hungry and aggressive.  He is latching on with the nipple shield related to mom's flat nipples.  Showed mom how to correctly put nipple shield on and how to make sure baby is latched on well, to be able to pull the nipple into the shield.  He had good jaw excursions with a wide mouth gape.and mom stated no pain with latch.  Set up breast pump for mom and encouraged her to also pump to help get her milk in faster.  More breast-feeding teaching done and encouraged her to reach out to me through the outpatient lactation clinic if she had any problems once she went home.      EQUIPMENT TYPE:  Breast Pump Type: manual pump (02/24/25 1600)  Breast Pump Flange Type: hard (02/24/25 1600)  Breast Pump Flange Size: 25 mm (02/24/25 1600)    Breast Care: Breastfeeding: open to air, nipple shield utilized (02/24/25 1600)  Breastfeeding Assistance: hand expression verified, infant latch-on verified, feeding cue recognition promoted, feeding on demand promoted, feeding session observed, nipple shield utilized, infant stimulated to wakeful state, support offered (02/24/25 1600)     Breastfeeding Support: diary/feeding log utilized, encouragement provided, infant-patient separation minimized, lactation counseling provided, patient hydration promoted, patient nutrition promoted, patient rest encouraged (02/24/25 1600)          BREAST PUMPING:  Breast Pumping Interventions: early pumping promoted (02/24/25 1600)       LACTATION REFERRALS:  Lactation Referrals: outpatient lactation program (as needed) (02/24/25 1600)

## 2025-02-25 NOTE — PLAN OF CARE
Goal Outcome Evaluation:  Plan of Care Reviewed With: significant other, patient            Pt VSS, I&O WNL, pt ambulating in room, remove knight catheter and vaginal packing this AM. Pt tolerating PO medications. Pt breastfeeding well and bonding with . Fundus firm at U with scant rubra lochia.

## 2025-02-25 NOTE — PROGRESS NOTES
[facility]  Bandar Jules  : 2000  MRN: [id number]  CSN: [id number]    Postpartum Day #1  Subjective   Her pain is well controlled.  Vaginal bleeding is appropriate amount. She is voiding without difficulty. She is breast feeding  She is ambulating without dizziness.     Objective     Min/max vitals past 24 hours:2   Temp  Min: 97.6 °F (36.4 °C)  Max: 98.1 °F (36.7 °C)  BP  Min: 91/60  Max: 147/124  Pulse  Min: 73  Max: 153  Resp  Min: 18  Max: 18        General: well developed; well nourished  no acute distress   Abdomen: fundus firm and non-tender   Pelvic: Not performed   Ext: Calves NT     Lab Results   Component Value Date    WBC 16.96 (H) 2025    HGB 9.2 (L) 2025    HCT 27.8 (L) 2025    MCV 89.4 2025     2025    ABO O 2025    RH Negative 2025    RUBELLAABIGG <0.90 (L) 2024    HEPBSAG Negative 2024        Assessment   Postpartum Day #1 S/P vacuum delivery  Anemia     Plan   Continue routine postpartum care  Ferrous sulfate BID    This note was electronically signed  Marion Boateng, MICHAEL  2025  08:42 EST

## 2025-02-26 VITALS
RESPIRATION RATE: 18 BRPM | WEIGHT: 209 LBS | HEART RATE: 85 BPM | DIASTOLIC BLOOD PRESSURE: 68 MMHG | BODY MASS INDEX: 38.46 KG/M2 | TEMPERATURE: 97.7 F | OXYGEN SATURATION: 97 % | SYSTOLIC BLOOD PRESSURE: 98 MMHG | HEIGHT: 62 IN

## 2025-02-26 PROBLEM — Z3A.39 39 WEEKS GESTATION OF PREGNANCY: Status: ACTIVE | Noted: 2025-02-23

## 2025-02-26 PROCEDURE — 0503F POSTPARTUM CARE VISIT: CPT | Performed by: STUDENT IN AN ORGANIZED HEALTH CARE EDUCATION/TRAINING PROGRAM

## 2025-02-26 RX ORDER — POLYETHYLENE GLYCOL 3350 17 G/17G
17 POWDER, FOR SOLUTION ORAL DAILY
Qty: 30 EACH | Refills: 1 | Status: SHIPPED | OUTPATIENT
Start: 2025-02-26

## 2025-02-26 RX ORDER — PNV NO.95/FERROUS FUM/FOLIC AC 28MG-0.8MG
325 TABLET ORAL 2 TIMES DAILY
Qty: 60 TABLET | Refills: 1 | Status: SHIPPED | OUTPATIENT
Start: 2025-02-26

## 2025-02-26 RX ORDER — DOCUSATE SODIUM 100 MG/1
100 CAPSULE, LIQUID FILLED ORAL 2 TIMES DAILY
Qty: 60 CAPSULE | Refills: 1 | Status: SHIPPED | OUTPATIENT
Start: 2025-02-26

## 2025-02-26 RX ORDER — ACETAMINOPHEN 500 MG
1000 TABLET ORAL EVERY 6 HOURS PRN
Qty: 60 TABLET | Refills: 1 | Status: SHIPPED | OUTPATIENT
Start: 2025-02-26

## 2025-02-26 RX ORDER — IBUPROFEN 800 MG/1
800 TABLET, FILM COATED ORAL EVERY 6 HOURS PRN
Qty: 30 TABLET | Refills: 1 | Status: SHIPPED | OUTPATIENT
Start: 2025-02-26

## 2025-02-26 RX ADMIN — DOCUSATE SODIUM 100 MG: 100 CAPSULE, LIQUID FILLED ORAL at 08:29

## 2025-02-26 RX ADMIN — PRENATAL VITAMINS-IRON FUMARATE 27 MG IRON-FOLIC ACID 0.8 MG TABLET 1 TABLET: at 08:29

## 2025-02-26 RX ADMIN — FERROUS SULFATE TAB EC 324 MG (65 MG FE EQUIVALENT) 324 MG: 324 (65 FE) TABLET DELAYED RESPONSE at 08:29

## 2025-02-26 NOTE — PLAN OF CARE
Goal Outcome Evaluation:  Plan of Care Reviewed With: patient, spouse        Progress: improving  Outcome Evaluation: VSS. I & O adequate. Minimal complaints of pain. Fundus and lochia WNL. Pt has not slept all night d/t cluster feeding and a fussy baby. Anticipating DC home today with spouse.

## 2025-02-26 NOTE — PLAN OF CARE
Problem: Adult Inpatient Plan of Care  Goal: Plan of Care Review  Outcome: Met  Flowsheets (Taken 2/26/2025 1028)  Outcome Evaluation: VSS, I/O adequate, patient ambulating independently, Fundus and lochia WDL, no complaints at this time, pain controlled with current pain medication regimen, discharge today  Goal: Patient-Specific Goal (Individualized)  Outcome: Met  Goal: Absence of Hospital-Acquired Illness or Injury  Outcome: Met  Intervention: Identify and Manage Fall Risk  Recent Flowsheet Documentation  Taken 2/26/2025 0829 by Sarah Camargo, RN  Safety Promotion/Fall Prevention:   safety round/check completed   room organization consistent   nonskid shoes/slippers when out of bed   fall prevention program maintained   clutter free environment maintained   assistive device/personal items within reach   activity supervised  Goal: Optimal Comfort and Wellbeing  Outcome: Met  Goal: Readiness for Transition of Care  Outcome: Met     Problem: Breastfeeding  Goal: Effective Breastfeeding  Outcome: Met     Problem: Postpartum (Vaginal Delivery)  Goal: Successful Parent Role Transition  Outcome: Met  Goal: Hemostasis  Outcome: Met  Goal: Absence of Infection Signs and Symptoms  Outcome: Met  Goal: Anesthesia/Sedation Recovery  Outcome: Met  Intervention: Optimize Anesthesia Recovery  Recent Flowsheet Documentation  Taken 2/26/2025 0829 by Sarah Camargo, RN  Safety Promotion/Fall Prevention:   safety round/check completed   room organization consistent   nonskid shoes/slippers when out of bed   fall prevention program maintained   clutter free environment maintained   assistive device/personal items within reach   activity supervised  Goal: Optimal Pain Control and Function  Outcome: Met  Goal: Effective Urinary Elimination  Outcome: Met   Goal Outcome Evaluation:              Outcome Evaluation: VSS, I/O adequate, patient ambulating independently, Fundus and lochia WDL, no complaints at this time, pain controlled with  current pain medication regimen, discharge today

## 2025-02-26 NOTE — PROGRESS NOTES
[facility]  Bandar Jules  : 2000  MRN: [id number]  CSN: [id number]    Postpartum Day #2  Subjective   Her pain is well controlled. Vaginal bleeding is appropriate. She is tolerating oral intake. She is ambulatory. She is voiding spontaneously. She is breast and bottle feeding and notes some increased fussiness in her baby overnight.     Objective     Min/max vitals past 24 hours:   Temp  Min: 97.9 °F (36.6 °C)  Max: 99.2 °F (37.3 °C)  BP  Min: 103/67  Max: 133/79  Pulse  Min: 87  Max: 111  Resp  Min: 17  Max: 18        General: Well developed, well nourished and in no acute distress   Abdomen: Soft, non-tender, no masses and fundus firm and non-tender   Pelvic: Not performed   Ext: Non-tender, no pitting edema     Lab Results   Component Value Date    WBC 16.96 (H) 2025    HGB 9.2 (L) 2025    HCT 27.8 (L) 2025    MCV 89.4 2025     2025    RUBELLAABIGG <0.90 (L) 2024    HEPBSAG Negative 2024        Assessment & Plan    Postpartum Day #2 S/P vacuum-assisted vaginal delivery with partial 3rd degree laceration.  Continue routine postpartum care  Encourage ambulation  Breastfeeding support PRN  - encouraged to work with lactation prior to d/c today  Continue iron on d/c  Continue stool softeners/ laxatives on d/c  Rh (-): s/p Rhogam PPD#1  Rubella nonimmune: s/p MMR PPD#1  Contraception: undecided    D/c today with 6 wk PP follow up or sooner as needed.    Samantha Sharma MD  2025  08:41 EST

## 2025-02-26 NOTE — DISCHARGE SUMMARY
Discharge Summary    [facility] Jules  : 2000  MRN: [id number]  [facility]: [id number]    Date of Admission: 2025   Date of Discharge:  2025   Delivering Physician: Lester De La Cruz       Admission Diagnosis: Pregnancy at 39w1d gestation  A1GDM   Discharge Diagnosis: Same as above plus  Pregnancy at 39w2d - delivered       Procedures: 2025 - Vaginal, Vacuum (Extractor)      Hospital Course  Patient is a 24 y.o.  who had a vacuum-assisted vaginal birth at 39w2d. Her postpartum course was without complications. On PPD # 2, she was ready for discharge. She was hemodynamically stable, had normal lochia, and her pain was well controlled with oral medications. She was breast and bottle feeding and undecided regarding contraception.    Infant  male fetus weighing 3657 g (8 lb 1 oz)  Apgars -  8 @ 1 minute /  9 @ 5 minutes.    Discharge Labs  Lab Results   Component Value Date    WBC 16.96 (H) 2025    HGB 9.2 (L) 2025    HCT 27.8 (L) 2025     2025     Discharge Medications     Discharge Medications        New Medications        Instructions Start Date   acetaminophen 500 MG tablet  Commonly known as: TYLENOL   1,000 mg, Oral, Every 6 Hours PRN      docusate sodium 100 MG capsule  Commonly known as: Colace   100 mg, Oral, 2 Times Daily      ibuprofen 800 MG tablet  Commonly known as: ADVIL,MOTRIN   800 mg, Oral, Every 6 Hours PRN      polyethylene glycol 17 g packet  Commonly known as: MiraLax   17 g, Oral, Daily             Changes to Medications        Instructions Start Date   ferrous sulfate 325 (65 Fe) MG tablet  What changed: See the new instructions.   325 mg, Oral, 2 Times Daily             Continue These Medications        Instructions Start Date   Alcohol Prep pads   1 each, Not Applicable, 4 Times Daily      glucose monitor monitoring kit   1 each, Not Applicable, As Needed      Lancets Thin misc   Use to test blood fasting  blood sugar and 1 hour after meals up to 4 times daily      PRENATAL 1 + IRON PO              Stop These Medications      glucose blood test strip     ondansetron ODT 4 MG disintegrating tablet  Commonly known as: ZOFRAN-ODT              Discharge Disposition: Home or Self Care   Condition on Discharge: Good   Follow-up: 6 weeks with EUEGNE Sharma MD  2/26/2025

## 2025-02-27 ENCOUNTER — MATERNAL SCREENING (OUTPATIENT)
Dept: CALL CENTER | Facility: HOSPITAL | Age: 25
End: 2025-02-27
Payer: COMMERCIAL

## 2025-02-27 NOTE — PAYOR COMM NOTE
"To:  [facility]  From: Lara Betancur RN  Phone: [phone]  Fax: [phone]  NPI: [id number]  TIN: [id number]  Member ID: [id number]   MRN: [id number]    Dhara, Bandar DARI (24 y.o. Female)       Date of Birth   2000    Social Security Number       Address   [address] [place] [address]    Home Phone   [phone]    MRN   [id number]       Voodoo   Judaism    Marital Status                               Admission Date   25    Admission Type   Elective    Admitting Provider   Melyssa Garcias MD    Attending Provider       Department, Room/Bed   [facility], [address]       Discharge Date   2025    Discharge Disposition   Home or Self Care    Discharge Destination                                 Attending Provider: (none)   Allergies: Latex    Isolation: None   Infection: None   Code Status: Prior    Ht: 157.5 cm (62.01\")   Wt: 94.8 kg (209 lb)    Admission Cmt: None   Principal Problem: 39 weeks gestation of pregnancy [Z3A.39]                   Active Insurance as of 2025       Primary Coverage       Payor Plan Insurance Group Employer/Plan Group    [facility] [id number]       Payor Plan Address Payor Plan Phone Number Payor Plan Fax Number Effective Dates    [address]   2021 - None Entered    [place] [id number]         Subscriber Name Subscriber Birth Date Member ID       KIRT JULES 1970 [id number]                     Emergency Contacts        (Rel.) Home Phone Work Phone Mobile Phone    RIP MYERS (Spouse) [phone] -- --                 Discharge Summary        Samantha Sharma MD at 25 0918          Discharge Summary    [facility] DARI Jules  : 2000  MRN: [id number]  CSN: [id number]    Date of Admission: 2025   Date of Discharge:  2025   Delivering Physician: Lester De La Cruz       Admission Diagnosis: Pregnancy at 39w1d gestation  A1GDM "   Discharge Diagnosis: Same as above plus  Pregnancy at 39w2d - delivered       Procedures: 2025 - Vaginal, Vacuum (Extractor)      Hospital Course  Patient is a 24 y.o.  who had a vacuum-assisted vaginal birth at 39w2d. Her postpartum course was without complications. On PPD # 2, she was ready for discharge. She was hemodynamically stable, had normal lochia, and her pain was well controlled with oral medications. She was breast and bottle feeding and undecided regarding contraception.    Infant  male fetus weighing 3657 g (8 lb 1 oz)  Apgars -  8 @ 1 minute /  9 @ 5 minutes.    Discharge Labs  Lab Results   Component Value Date    WBC 16.96 (H) 2025    HGB 9.2 (L) 2025    HCT 27.8 (L) 2025     2025     Discharge Medications     Discharge Medications        New Medications        Instructions Start Date   acetaminophen 500 MG tablet  Commonly known as: TYLENOL   1,000 mg, Oral, Every 6 Hours PRN      docusate sodium 100 MG capsule  Commonly known as: Colace   100 mg, Oral, 2 Times Daily      ibuprofen 800 MG tablet  Commonly known as: ADVIL,MOTRIN   800 mg, Oral, Every 6 Hours PRN      polyethylene glycol 17 g packet  Commonly known as: MiraLax   17 g, Oral, Daily             Changes to Medications        Instructions Start Date   ferrous sulfate 325 (65 Fe) MG tablet  What changed: See the new instructions.   325 mg, Oral, 2 Times Daily             Continue These Medications        Instructions Start Date   Alcohol Prep pads   1 each, Not Applicable, 4 Times Daily      glucose monitor monitoring kit   1 each, Not Applicable, As Needed      Lancets Thin misc   Use to test blood fasting blood sugar and 1 hour after meals up to 4 times daily      PRENATAL 1 + IRON PO              Stop These Medications      glucose blood test strip     ondansetron ODT 4 MG disintegrating tablet  Commonly known as: ZOFRAN-ODT              Discharge Disposition: Home or Self Care   Condition on  Discharge: Good   Follow-up: 6 weeks with MGE ADRIEN Sharma MD  2/26/2025      Electronically signed by Samantha Sharma MD at 02/26/25 0974

## 2025-02-27 NOTE — OUTREACH NOTE
Maternal Screening Survey      Flowsheet Row Responses   Eligibility Eligible   Prep survey completed? Yes   Facility patient discharged from? [facility] - Registered Nurse

## 2025-03-05 ENCOUNTER — TELEPHONE (OUTPATIENT)
Dept: OBSTETRICS AND GYNECOLOGY | Facility: CLINIC | Age: 25
End: 2025-03-05
Payer: COMMERCIAL

## 2025-03-05 RX ORDER — CEPHALEXIN 500 MG/1
500 CAPSULE ORAL 3 TIMES DAILY
Qty: 21 CAPSULE | Refills: 0 | Status: SHIPPED | OUTPATIENT
Start: 2025-03-05

## 2025-03-05 NOTE — TELEPHONE ENCOUNTER
Keflex 3 times a day for 7 days sent in.  Make sure drinking lots of water.  If symptoms do not rapidly improve needs to come in for full urine analysis.  Thank you

## 2025-03-06 NOTE — TELEPHONE ENCOUNTER
"Chief Complaint   Patient presents with   • Arm Pain     Pt reports right axillary pain with lumps for over a months. Pt also reports cough, chronic in nature. Blood pressure 125/76, pulse 95, temperature 36.4 °C (97.6 °F), temperature source Temporal, resp. rate 16, height 1.6 m (5' 3\"), weight 69.1 kg (152 lb 5.4 oz), SpO2 100 %.      " ATTEMPTED TO CALL PATIENT, NO ANSWER. LEFT VM TO CALL THE OFFICE.

## 2025-03-07 ENCOUNTER — MATERNAL SCREENING (OUTPATIENT)
Dept: CALL CENTER | Facility: HOSPITAL | Age: 25
End: 2025-03-07
Payer: COMMERCIAL

## 2025-03-07 NOTE — OUTREACH NOTE
Maternal Screening Survey      Flowsheet Row Responses   Facility patient discharged from? [facility]   Attempt successful? Yes   Call start time 1428   Call end time 1430   I have been able to laugh and see the funny side of things. 0   I have looked forward with enjoyment to things. 0   I have blamed myself unnecessarily when things went wrong. 2   I have been anxious or worried for no good reason. 0   I have felt scared or panicky for no good reason. 0   Things have been getting on top of me. 0   I have been so unhappy that I have had difficulty sleeping. 0   I have felt sad or miserable. 0   I have been so unhappy that I have been crying. 0   The thought of harming myself has occurred to me. 0   Carroll  Depression Scale Total 2   Did any of your parents have problems with alcohol or drug use? No   Do any of your peers have problems with alcohol or drug use? No   Does your partner have problems with alcohol or drug use? No   Before you were pregnant did you have problems with alcohol or drug use? (past) No   In the past month, did you drink beer, wine, liquor or use any other drugs? (pregnancy) No   Maternal Screening call completed Yes   Call end time 1430              [facility] - Registered Nurse

## 2025-03-10 ENCOUNTER — POSTPARTUM VISIT (OUTPATIENT)
Dept: OBSTETRICS AND GYNECOLOGY | Facility: CLINIC | Age: 25
End: 2025-03-10
Payer: COMMERCIAL

## 2025-03-10 VITALS
BODY MASS INDEX: 35.11 KG/M2 | HEIGHT: 62 IN | WEIGHT: 190.8 LBS | DIASTOLIC BLOOD PRESSURE: 70 MMHG | SYSTOLIC BLOOD PRESSURE: 110 MMHG

## 2025-03-10 DIAGNOSIS — O92.79 POSTPARTUM BREAST ENGORGEMENT: Primary | ICD-10-CM

## 2025-03-10 NOTE — PROGRESS NOTES
Subjective   Chief Complaint   Patient presents with    Postpartum Care     2 Weeks postpartum with a sore on nipple     Bandar Jules is a 24 y.o. year old .  No LMP recorded.  She presents to be seen because of 2 weeks postpartum.  Patient breast-feeding will try to wean..     OTHER COMPLAINTS:  Nothing else    The following portions of the patient's history were reviewed and updated as appropriate:She  has a past medical history of Allergic (2000), GDM (gestational diabetes mellitus), class A1 (2024), Headache, PMS (premenstrual syndrome) (), Pregnancy (2025), Sensitive skin, and Urinary tract infection ().  She does not have any pertinent problems on file.  She  has a past surgical history that includes Ear Tubes Removal () and Edison tooth extraction (?).  Her family history includes Basal cell carcinoma in her father; Breast cancer in her maternal grandmother; Cancer in her maternal aunt and maternal grandmother; Diabetes in her paternal grandfather and paternal uncle; Lung cancer in her paternal grandfather; Miscarriages / Stillbirths in her mother; Ovarian cancer in her maternal aunt.  She  reports that she has never smoked. She has never been exposed to tobacco smoke. She has never used smokeless tobacco. She reports that she does not currently use alcohol. She reports that she does not use drugs.  Current Outpatient Medications   Medication Sig Dispense Refill    cephalexin (KEFLEX) 500 MG capsule Take 1 capsule by mouth 3 (Three) Times a Day. 21 capsule 0    docusate sodium (Colace) 100 MG capsule Take 1 capsule by mouth 2 (Two) Times a Day. 60 capsule 1    ferrous sulfate 325 (65 Fe) MG tablet Take 1 tablet by mouth 2 (Two) Times a Day. 60 tablet 1    polyethylene glycol (MiraLax) 17 g packet Dissolve 17 g (1 packet) in a beverage of your choice and drink by mouth Daily. 30 each 1    Prenatal Multivit-Min-Fe-FA (PRENATAL 1 + IRON PO)       acetaminophen  (TYLENOL) 500 MG tablet Take 2 tablets by mouth Every 6 (Six) Hours As Needed for Mild Pain. 60 tablet 1    Alcohol Swabs (Alcohol Prep) pads Use 1 each 4 (Four) Times a Day. 100 each 2    glucose monitor monitoring kit Use 1 each As Needed (Fasting and 1 hour after meals (4 times daily)). 1 each 0    ibuprofen (ADVIL,MOTRIN) 800 MG tablet Take 1 tablet by mouth Every 6 (Six) Hours As Needed for Mild Pain. 30 tablet 1    Lancets Thin misc Use to test blood fasting blood sugar and 1 hour after meals up to 4 times daily 100 each 2     No current facility-administered medications for this visit.     Current Outpatient Medications on File Prior to Visit   Medication Sig    cephalexin (KEFLEX) 500 MG capsule Take 1 capsule by mouth 3 (Three) Times a Day.    docusate sodium (Colace) 100 MG capsule Take 1 capsule by mouth 2 (Two) Times a Day.    ferrous sulfate 325 (65 Fe) MG tablet Take 1 tablet by mouth 2 (Two) Times a Day.    polyethylene glycol (MiraLax) 17 g packet Dissolve 17 g (1 packet) in a beverage of your choice and drink by mouth Daily.    Prenatal Multivit-Min-Fe-FA (PRENATAL 1 + IRON PO)     acetaminophen (TYLENOL) 500 MG tablet Take 2 tablets by mouth Every 6 (Six) Hours As Needed for Mild Pain.    Alcohol Swabs (Alcohol Prep) pads Use 1 each 4 (Four) Times a Day.    glucose monitor monitoring kit Use 1 each As Needed (Fasting and 1 hour after meals (4 times daily)).    ibuprofen (ADVIL,MOTRIN) 800 MG tablet Take 1 tablet by mouth Every 6 (Six) Hours As Needed for Mild Pain.    Lancets Thin misc Use to test blood fasting blood sugar and 1 hour after meals up to 4 times daily     No current facility-administered medications on file prior to visit.     She is allergic to latex.    Social History    Tobacco Use      Smoking status: Never        Passive exposure: Never      Smokeless tobacco: Never    Review of Systems  Consitutional POS: nothing reported    NEG: anorexia or night sweats   Gastointestinal POS:  "nothing reported    NEG: bloating, change in bowel habits, melena, or reflux symptoms   Genitourinary POS: nothing reported    NEG: dysuria or hematuria   Integument POS: nothing reported    NEG: moles that are changing in size, shape, color or rashes   Breast POS: nothing reported    NEG: persistent breast lump, skin dimpling, or nipple discharge         Respiratory: negative  Cardiovascular: negative  GYN:  negative          Objective   /70   Ht 157.5 cm (62\")   Wt 86.5 kg (190 lb 12.8 oz)   Breastfeeding Yes   BMI 34.90 kg/m²     General:  well developed; well nourished  no acute distress  mentation appropriate   Skin:  Not performed.   Thyroid: not examined   Lungs:  breathing is unlabored   Heart:  Not performed.   Breasts:  Examined in supine position  Symmetric without masses or skin dimpling  Nipples normal without inversion, lesions or discharge  There are no palpable axillary nodes   Abdomen: soft, non-tender; no masses  no umbilical or inguinal hernias are present  no hepato-splenomegaly   Pelvis: Not performed.     Psychiatric: Alert and oriented ×3, mood and affect appropriate  HEENT: Atraumatic, normocephalic, normal scleral icterus  Extremities: 2+ pulses bilaterally, no edema      Lab Review   No data reviewed    Imaging   No data reviewed        Assessment   PP vag     Plan   Bilateral mild engorgement without signs of mastitis.  No erythema.  Instructed on nipple cares.  Given significant milk production would recommend continuing to breast-feed for another 4 weeks and then can wean      No orders of the defined types were placed in this encounter.         This note was electronically signed.      March 10, 2025      "

## 2025-04-16 ENCOUNTER — POSTPARTUM VISIT (OUTPATIENT)
Dept: OBSTETRICS AND GYNECOLOGY | Facility: CLINIC | Age: 25
End: 2025-04-16
Payer: COMMERCIAL

## 2025-04-16 VITALS
BODY MASS INDEX: 34.85 KG/M2 | SYSTOLIC BLOOD PRESSURE: 100 MMHG | WEIGHT: 189.4 LBS | HEIGHT: 62 IN | DIASTOLIC BLOOD PRESSURE: 62 MMHG

## 2025-04-16 DIAGNOSIS — Z12.4 SCREENING FOR CERVICAL CANCER: Primary | ICD-10-CM

## 2025-04-16 NOTE — PROGRESS NOTES
Subjective   Chief Complaint   Patient presents with    Postpartum Care     7 Weeks post partum and pap smear     Bandar Jules is a 24 y.o. year old .  Patient's last menstrual period was 2025 (exact date).  She presents to be seen because of 7 weeks status post vacuum-assisted vaginal delivery.  Requiring Pap smear..     OTHER COMPLAINTS:  Nothing else    The following portions of the patient's history were reviewed and updated as appropriate:She  has a past medical history of Allergic (1999), GDM (gestational diabetes mellitus), class A1 (2024), Headache, PMS (premenstrual syndrome) (), Pregnancy (2025), Sensitive skin, and Urinary tract infection ().  She does not have any pertinent problems on file.  She  has a past surgical history that includes Ear Tubes Removal () and [place] tooth extraction (?).  Her family history includes Basal cell carcinoma in her father; Breast cancer in her maternal grandmother; Cancer in her maternal aunt and maternal grandmother; Diabetes in her paternal grandfather and paternal uncle; Lung cancer in her paternal grandfather; Miscarriages / Stillbirths in her mother; Ovarian cancer in her maternal aunt.  She  reports that she has never smoked. She has never been exposed to tobacco smoke. She has never used smokeless tobacco. She reports that she does not currently use alcohol. She reports that she does not use drugs.  Current Outpatient Medications   Medication Sig Dispense Refill    docusate sodium (Colace) 100 MG capsule Take 1 capsule by mouth 2 (Two) Times a Day. 60 capsule 1    ferrous sulfate 325 (65 Fe) MG tablet Take 1 tablet by mouth 2 (Two) Times a Day. 60 tablet 1    Prenatal Multivit-Min-Fe-FA (PRENATAL 1 + IRON PO)       acetaminophen (TYLENOL) 500 MG tablet Take 2 tablets by mouth Every 6 (Six) Hours As Needed for Mild Pain. 60 tablet 1    Alcohol Swabs (Alcohol Prep) pads Use 1 each 4 (Four) Times a Day. 100 each 2     cephalexin (KEFLEX) 500 MG capsule Take 1 capsule by mouth 3 (Three) Times a Day. 21 capsule 0    glucose monitor monitoring kit Use 1 each As Needed (Fasting and 1 hour after meals (4 times daily)). 1 each 0    ibuprofen (ADVIL,MOTRIN) 800 MG tablet Take 1 tablet by mouth Every 6 (Six) Hours As Needed for Mild Pain. 30 tablet 1    Lancets Thin misc Use to test blood fasting blood sugar and 1 hour after meals up to 4 times daily 100 each 2    polyethylene glycol (MiraLax) 17 g packet Dissolve 17 g (1 packet) in a beverage of your choice and drink by mouth Daily. 30 each 1     No current facility-administered medications for this visit.     Current Outpatient Medications on File Prior to Visit   Medication Sig    docusate sodium (Colace) 100 MG capsule Take 1 capsule by mouth 2 (Two) Times a Day.    ferrous sulfate 325 (65 Fe) MG tablet Take 1 tablet by mouth 2 (Two) Times a Day.    Prenatal Multivit-Min-Fe-FA (PRENATAL 1 + IRON PO)     acetaminophen (TYLENOL) 500 MG tablet Take 2 tablets by mouth Every 6 (Six) Hours As Needed for Mild Pain.    Alcohol Swabs (Alcohol Prep) pads Use 1 each 4 (Four) Times a Day.    cephalexin (KEFLEX) 500 MG capsule Take 1 capsule by mouth 3 (Three) Times a Day.    glucose monitor monitoring kit Use 1 each As Needed (Fasting and 1 hour after meals (4 times daily)).    ibuprofen (ADVIL,MOTRIN) 800 MG tablet Take 1 tablet by mouth Every 6 (Six) Hours As Needed for Mild Pain.    Lancets Thin misc Use to test blood fasting blood sugar and 1 hour after meals up to 4 times daily    polyethylene glycol (MiraLax) 17 g packet Dissolve 17 g (1 packet) in a beverage of your choice and drink by mouth Daily.     No current facility-administered medications on file prior to visit.     She is allergic to latex.    Social History    Tobacco Use      Smoking status: Never        Passive exposure: Never      Smokeless tobacco: Never    Review of Systems  Consitutional POS: nothing reported    NEG:  "anorexia or night sweats   Gastointestinal POS: nothing reported    NEG: bloating, change in bowel habits, melena, or reflux symptoms   Genitourinary POS: nothing reported    NEG: dysuria or hematuria   Integument POS: nothing reported    NEG: moles that are changing in size, shape, color or rashes   Breast POS: nothing reported    NEG: persistent breast lump, skin dimpling, or nipple discharge         Respiratory: negative  Cardiovascular: negative  GYN:  negative          Objective   /62   Ht 157.5 cm (62\")   Wt 85.9 kg (189 lb 6.4 oz)   LMP 04/11/2025 (Exact Date)   Breastfeeding No   BMI 34.64 kg/m²     General:  well developed; well nourished  no acute distress  mentation appropriate   Skin:  No suspicious lesions seen   Thyroid: normal to inspection and palpation   Lungs:  breathing is unlabored  clear to auscultation bilaterally   Heart:  regular rate and rhythm, S1, S2 normal, no murmur, click, rub or gallop   Breasts:  Examined in supine position  Symmetric without masses or skin dimpling  Nipples normal without inversion, lesions or discharge  There are no palpable axillary nodes   Abdomen: soft, non-tender; no masses  no umbilical or inguinal hernias are present  no hepato-splenomegaly   Pelvis: Clinical staff was present for exam  External genitalia:  normal appearance of the external genitalia including Bartholin's and [place]'s glands.  :  urethral meatus normal; urethra normal:  Vaginal:  normal pink mucosa without prolapse or lesions.  Cervix:  normal appearance.  Uterus:  normal size, shape and consistency.  Adnexa:  normal bimanual exam of the adnexa.     Psychiatric: Alert and oriented ×3, mood and affect appropriate  HEENT: Atraumatic, normocephalic, normal scleral icterus  Extremities: 2+ pulses bilaterally, no edema      Lab Review   CBC    Imaging   No data reviewed        Assessment   7 weeks status post vacuum-assisted vaginal delivery: Overall patient doing well.  Contraception   "   Plan   Pap smear done.  Mirena IUD ordered and will set up.  Discussed H&H continue taking prenatal with iron for now.  Did discuss that she was gestational diabetic to get yearly blood sugar glucose screenings.      No orders of the defined types were placed in this encounter.         This note was electronically signed.      April 16, 2025

## 2025-04-18 ENCOUNTER — OFFICE VISIT (OUTPATIENT)
Dept: OBSTETRICS AND GYNECOLOGY | Facility: CLINIC | Age: 25
End: 2025-04-18
Payer: COMMERCIAL

## 2025-04-18 VITALS
DIASTOLIC BLOOD PRESSURE: 62 MMHG | SYSTOLIC BLOOD PRESSURE: 110 MMHG | BODY MASS INDEX: 34.48 KG/M2 | WEIGHT: 187.4 LBS | HEIGHT: 62 IN

## 2025-04-18 DIAGNOSIS — Z30.014 ENCOUNTER FOR INITIAL PRESCRIPTION OF INTRAUTERINE CONTRACEPTIVE DEVICE (IUD): Primary | ICD-10-CM

## 2025-04-18 LAB
B-HCG UR QL: NEGATIVE
EXPIRATION DATE: NORMAL
INTERNAL NEGATIVE CONTROL: NORMAL
INTERNAL POSITIVE CONTROL: NORMAL
Lab: NORMAL

## 2025-04-18 NOTE — PROGRESS NOTES
..IUD Insertion    Patient's last menstrual period was 04/11/2025 (exact date).    Date of procedure:  4/18/2025    Risks and benefits discussed? yes  All questions answered? yes  Consents given by The patient  Written consent obtained? yes    Local anesthesia used:  no    Procedure documentation:    After verifying the patient had a low probability of being pregnant and met the criteria for insertion, a sterile speculum has placed and the cervix was cleansed with an antiseptic solution.  Vaginal discharge was scant.  The anterior lip of the cervix was grasped with a tenaculum and the uterine cavity was gently sounded. There was mild difficulty passing the sound through the cervix.  Cervical dilation did not need to be performed prior to placing the IUD.  The uterus was anteverted and sounded to 8 cms.  The Mirena was then prepared per the manufacturers instructions.    The Mirena was advanced to a point 2 cms from the fundus and then the arms were released from the sheath.  The device was advanced to the fundus and the device was released fully from the sheath.. The string was cut 2 cms in length.  Bleeding from the cervix was scant.    She tolerated the procedure without any difficulty.    Post procedure instructions: Call if any fever or excessive bleeding or pain.    Follow up needed:  6 weeks to confirm correct placement    This note was electronically signed.    Lester De La Cruz MD

## 2025-04-21 LAB — REF LAB TEST METHOD: NORMAL

## 2025-05-26 ENCOUNTER — TELEMEDICINE (OUTPATIENT)
Dept: FAMILY MEDICINE CLINIC | Facility: TELEHEALTH | Age: 25
End: 2025-05-26
Payer: COMMERCIAL

## 2025-05-26 DIAGNOSIS — H92.02 ACUTE OTALGIA, LEFT: Primary | ICD-10-CM

## 2025-05-26 DIAGNOSIS — J30.9 ALLERGIC RHINITIS, UNSPECIFIED SEASONALITY, UNSPECIFIED TRIGGER: ICD-10-CM

## 2025-05-26 RX ORDER — FLUTICASONE PROPIONATE 50 MCG
2 SPRAY, SUSPENSION (ML) NASAL DAILY
Qty: 16 G | Refills: 0 | Status: SHIPPED | OUTPATIENT
Start: 2025-05-26

## 2025-05-26 RX ORDER — CETIRIZINE HYDROCHLORIDE 10 MG/1
10 TABLET ORAL NIGHTLY
Qty: 30 TABLET | Refills: 0 | Status: SHIPPED | OUTPATIENT
Start: 2025-05-26

## 2025-05-26 NOTE — PATIENT INSTRUCTIONS
Follow up in person if no improvement in 2 days or if symptoms worsen  Push fluids, rest  Tylenol/ibuprofen for pain or fever>101  Saline nasal spray/irrigation, humidified air for sinus symptoms  Flonase, mucinex with a full glass of water for congestion

## 2025-05-26 NOTE — PROGRESS NOTES
Subjective   Bandar Jules is a 24 y.o. female.     History of Present Illness  She presents with c/o congestion, left ear pain x 2-3 days. She has a history of allergies and frequent ear infections. She has a history of tympanostomy tube placement but does not have any in currently. Her symptoms feel similar to ear infections she has had before. She is not pregnant or breastfeeding.        The following portions of the patient's history were reviewed and updated as appropriate: allergies, current medications, past family history, past medical history, past social history, past surgical history, and problem list.    Review of Systems   Constitutional:  Negative for fever.   HENT:  Positive for congestion, ear pain, hearing loss and rhinorrhea. Negative for ear discharge and sinus pressure.    Respiratory:  Positive for cough. Negative for shortness of breath and wheezing.        Objective   Physical Exam  Constitutional:       General: She is not in acute distress.     Appearance: She is well-developed. She is not diaphoretic.   HENT:      Right Ear: Hearing and external ear normal.      Left Ear: Swelling and tenderness present. No drainage.   Pulmonary:      Effort: Pulmonary effort is normal.   Neurological:      Mental Status: She is alert and oriented to person, place, and time.   Psychiatric:         Behavior: Behavior normal.           Assessment & Plan   Diagnoses and all orders for this visit:    1. Acute otalgia, left (Primary)  -     fluticasone (FLONASE) 50 MCG/ACT nasal spray; Administer 2 sprays into the nostril(s) as directed by provider Daily.  Dispense: 16 g; Refill: 0  -     amoxicillin-clavulanate (AUGMENTIN) 875-125 MG per tablet; Take 1 tablet by mouth 2 (Two) Times a Day for 10 days.  Dispense: 20 tablet; Refill: 0  -     cetirizine (zyrTEC) 10 MG tablet; Take 1 tablet by mouth Every Night.  Dispense: 30 tablet; Refill: 0    2. Allergic rhinitis, unspecified seasonality, unspecified  trigger  -     fluticasone (FLONASE) 50 MCG/ACT nasal spray; Administer 2 sprays into the nostril(s) as directed by provider Daily.  Dispense: 16 g; Refill: 0  -     cetirizine (zyrTEC) 10 MG tablet; Take 1 tablet by mouth Every Night.  Dispense: 30 tablet; Refill: 0                 The use of a video visit has been reviewed with the patient and verbal informed consent has been obtained. Myself and Bandar Jules participated in this visit. The patient is located in [place], [place]. I am located in [place], [place]. [employer] and [employer] were utilized. I spent 10 minutes in the patient's chart for this visit.

## 2025-07-10 ENCOUNTER — TELEPHONE (OUTPATIENT)
Dept: INTERNAL MEDICINE | Facility: CLINIC | Age: 25
End: 2025-07-10
Payer: COMMERCIAL

## 2025-07-10 DIAGNOSIS — Z00.00 ANNUAL PHYSICAL EXAM: Primary | ICD-10-CM

## 2025-07-10 DIAGNOSIS — R79.9 ABNORMAL BLOOD CHEMISTRY: ICD-10-CM

## 2025-07-10 DIAGNOSIS — Z13.220 SCREENING CHOLESTEROL LEVEL: ICD-10-CM

## 2025-07-10 DIAGNOSIS — E55.9 VITAMIN D DEFICIENCY: ICD-10-CM

## 2025-07-10 NOTE — TELEPHONE ENCOUNTER
Left message on patient's voicemail that her lab ordered is in and patient can go have it done in the morning.  But Dr. Fagan will not have results until Monday.

## 2025-07-10 NOTE — TELEPHONE ENCOUNTER
Caller: Bandar Jules    Relationship: Self    Best call back number: [phone]     Requested Prescriptions:   Requested Prescriptions     Pending Prescriptions Disp Refills    ferrous sulfate 325 (65 Fe) MG tablet 60 tablet 1     Sig: Take 1 tablet by mouth 2 (Two) Times a Day.        Pharmacy where request should be sent: [facility] [id number] [address] AT [facility] [phone] [facility] [phone] FX     Last office visit with prescribing clinician: 9/13/2024   Last telemedicine visit with prescribing clinician: Visit date not found   Next office visit with prescribing clinician: 7/11/2025     Additional details provided by patient: PATIENT FEELS THAT HER IRON IS LOW AFTER HAVING HER BABY. SHE IS REQUESTING A REFILL. PATIENT IS OUT OF MEDICATION.    Does the patient have less than a 3 day supply:  [x] Yes  [] No    Would you like a call back once the refill request has been completed: [] Yes [x] No    If the office needs to give you a call back, can they leave a voicemail: [] Yes [x] No    Nika Mckay Rep   07/10/25 15:03 EDT

## 2025-07-10 NOTE — TELEPHONE ENCOUNTER
Caller: Bandar Jules    Relationship: Self    Best call back number: [phone]     What is the best time to reach you: ANYTIME AND THROUGH [employer]      What was the call regarding: PATIENT WOULD LIKE TO HAVE LABS ORDERED BEFORE HER APPOINTMENT ON 07/11/25 AT 3:00 PM. SHE WANTS TO COME IN THE MORNING.    Is it okay if the provider responds through [employer]: YES

## 2025-07-11 ENCOUNTER — OFFICE VISIT (OUTPATIENT)
Dept: INTERNAL MEDICINE | Facility: CLINIC | Age: 25
End: 2025-07-11
Payer: COMMERCIAL

## 2025-07-11 VITALS
HEART RATE: 90 BPM | HEIGHT: 62 IN | RESPIRATION RATE: 18 BRPM | WEIGHT: 199 LBS | OXYGEN SATURATION: 99 % | TEMPERATURE: 98.2 F | DIASTOLIC BLOOD PRESSURE: 61 MMHG | SYSTOLIC BLOOD PRESSURE: 117 MMHG | BODY MASS INDEX: 36.62 KG/M2

## 2025-07-11 DIAGNOSIS — E55.9 VITAMIN D DEFICIENCY: ICD-10-CM

## 2025-07-11 DIAGNOSIS — Z00.00 ANNUAL PHYSICAL EXAM: Primary | ICD-10-CM

## 2025-07-11 DIAGNOSIS — E61.1 IRON DEFICIENCY: ICD-10-CM

## 2025-07-11 PROCEDURE — 99395 PREV VISIT EST AGE 18-39: CPT | Performed by: STUDENT IN AN ORGANIZED HEALTH CARE EDUCATION/TRAINING PROGRAM

## 2025-07-11 RX ORDER — PNV NO.95/FERROUS FUM/FOLIC AC 28MG-0.8MG
325 TABLET ORAL 2 TIMES DAILY
Qty: 60 TABLET | Refills: 1 | OUTPATIENT
Start: 2025-07-11

## 2025-07-11 NOTE — PROGRESS NOTES
Subjective   Bandar Jules is a 24 y.o. female.     History of Present Illness  Presents for annual  Labs due  Pap utd  Vaccines utd except covid    Has been having dizziness when leaning over for long periods of time. Was iron deficient during pregnancy and has been out of vitamins for a few months      The following portions of the patient's history were reviewed and updated as appropriate: allergies, current medications, past family history, past medical history, past social history, past surgical history, and problem list.    Review of Systems   All other systems reviewed and are negative.      Objective   Physical Exam  Vitals and nursing note reviewed.   Constitutional:       Appearance: Normal appearance.   HENT:      Head: Normocephalic and atraumatic.      Right Ear: External ear normal.      Left Ear: External ear normal.      Nose: Nose normal.      Mouth/Throat:      Mouth: Mucous membranes are moist.      Pharynx: Oropharynx is clear. No oropharyngeal exudate or posterior oropharyngeal erythema.   Eyes:      Extraocular Movements: Extraocular movements intact.      Conjunctiva/sclera: Conjunctivae normal.      Pupils: Pupils are equal, round, and reactive to light.   Cardiovascular:      Rate and Rhythm: Normal rate and regular rhythm.      Pulses: Normal pulses.      Heart sounds: Normal heart sounds.   Pulmonary:      Effort: Pulmonary effort is normal.      Breath sounds: Normal breath sounds.   Abdominal:      General: Abdomen is flat. Bowel sounds are normal.      Palpations: Abdomen is soft.   Musculoskeletal:         General: Normal range of motion.      Cervical back: Normal range of motion.   Skin:     General: Skin is warm.      Capillary Refill: Capillary refill takes less than 2 seconds.   Neurological:      General: No focal deficit present.      Mental Status: She is alert and oriented to person, place, and time. Mental status is at baseline.   Psychiatric:         Mood and Affect:  Mood normal.         Behavior: Behavior normal.         Thought Content: Thought content normal.         Judgment: Judgment normal.         Assessment & Plan   Diagnoses and all orders for this visit:    1. Annual physical exam (Primary)  -     Iron Profile w/o Ferritin  -     Ferritin    2. Vitamin D deficiency  -     Iron Profile w/o Ferritin  -     Ferritin    3. Iron deficiency  -     Iron Profile w/o Ferritin  -     Ferritin     Counseled on heart healthy diet  Counseled on exercise 30 minutes a day 5 days a week  Follow with optometry once a year  Follow with dentistry twice a year  Brush and floss twice a day  Wear seatbelt while in a car

## 2025-07-12 LAB
25(OH)D3+25(OH)D2 SERPL-MCNC: 24.9 NG/ML (ref 30–100)
ALBUMIN SERPL-MCNC: 3.9 G/DL (ref 3.5–5.2)
ALBUMIN/GLOB SERPL: 1.4 G/DL
ALP SERPL-CCNC: 84 U/L (ref 39–117)
ALT SERPL-CCNC: 15 U/L (ref 1–33)
AST SERPL-CCNC: 17 U/L (ref 1–32)
BILIRUB SERPL-MCNC: 0.3 MG/DL (ref 0–1.2)
BUN SERPL-MCNC: 9 MG/DL (ref 6–20)
BUN/CREAT SERPL: 13.8 (ref 7–25)
CALCIUM SERPL-MCNC: 9.3 MG/DL (ref 8.6–10.5)
CHLORIDE SERPL-SCNC: 106 MMOL/L (ref 98–107)
CHOLEST SERPL-MCNC: 150 MG/DL (ref 0–200)
CO2 SERPL-SCNC: 23.9 MMOL/L (ref 22–29)
CREAT SERPL-MCNC: 0.65 MG/DL (ref 0.57–1)
EGFRCR SERPLBLD CKD-EPI 2021: 125.5 ML/MIN/1.73
ERYTHROCYTE [DISTWIDTH] IN BLOOD BY AUTOMATED COUNT: 13.2 % (ref 12.3–15.4)
GLOBULIN SER CALC-MCNC: 2.8 GM/DL
GLUCOSE SERPL-MCNC: 86 MG/DL (ref 65–99)
HBA1C MFR BLD: 5.5 % (ref 4.8–5.6)
HCT VFR BLD AUTO: 36.3 % (ref 34–46.6)
HDLC SERPL-MCNC: 35 MG/DL (ref 40–60)
HGB BLD-MCNC: 12.1 G/DL (ref 12–15.9)
LDLC SERPL CALC-MCNC: 98 MG/DL (ref 0–100)
MCH RBC QN AUTO: 29.3 PG (ref 26.6–33)
MCHC RBC AUTO-ENTMCNC: 33.3 G/DL (ref 31.5–35.7)
MCV RBC AUTO: 87.9 FL (ref 79–97)
PLATELET # BLD AUTO: 268 10*3/MM3 (ref 140–450)
POTASSIUM SERPL-SCNC: 4.3 MMOL/L (ref 3.5–5.2)
PROT SERPL-MCNC: 6.7 G/DL (ref 6–8.5)
RBC # BLD AUTO: 4.13 10*6/MM3 (ref 3.77–5.28)
SODIUM SERPL-SCNC: 141 MMOL/L (ref 136–145)
TRIGL SERPL-MCNC: 89 MG/DL (ref 0–150)
TSH SERPL DL<=0.005 MIU/L-ACNC: 0.75 UIU/ML (ref 0.27–4.2)
VIT B12 SERPL-MCNC: 388 PG/ML (ref 211–946)
VLDLC SERPL CALC-MCNC: 17 MG/DL (ref 5–40)
WBC # BLD AUTO: 9.34 10*3/MM3 (ref 3.4–10.8)

## 2025-07-29 ENCOUNTER — TELEMEDICINE (OUTPATIENT)
Dept: INTERNAL MEDICINE | Facility: CLINIC | Age: 25
End: 2025-07-29
Payer: COMMERCIAL

## 2025-07-29 VITALS — BODY MASS INDEX: 36.8 KG/M2 | WEIGHT: 200 LBS | HEIGHT: 62 IN

## 2025-07-29 DIAGNOSIS — F41.8 POSTPARTUM ANXIETY: Primary | ICD-10-CM

## 2025-07-29 PROCEDURE — 99213 OFFICE O/P EST LOW 20 MIN: CPT | Performed by: STUDENT IN AN ORGANIZED HEALTH CARE EDUCATION/TRAINING PROGRAM

## 2025-07-29 RX ORDER — ESCITALOPRAM OXALATE 5 MG/1
5 TABLET ORAL DAILY
Qty: 90 TABLET | Refills: 0 | Status: SHIPPED | OUTPATIENT
Start: 2025-07-29

## 2025-07-29 RX ORDER — BUSPIRONE HYDROCHLORIDE 5 MG/1
5 TABLET ORAL 3 TIMES DAILY PRN
Qty: 270 TABLET | Refills: 0 | Status: SHIPPED | OUTPATIENT
Start: 2025-07-29

## 2025-07-29 NOTE — PROGRESS NOTES
"Chief Complaint  Anxiety (Postpartum anxiety)    Subjective           Bandar Jules presents to [facility]  History of Present Illness  Presents with complaints of anxiety that has worsened since having her child in February. States that she is nervous to let anyone hold the baby. Doesn't like people to be around in case he gets sick. Is nervous in grocery stores if people talk to the baby and if other babies cry she immediately freezes up in panic. No si hi.     Objective   Vital Signs:   Ht 157.5 cm (62\")   Wt 90.7 kg (200 lb)   BMI 36.58 kg/m²     Estimated body mass index is 36.58 kg/m² as calculated from the following:    Height as of this encounter: 157.5 cm (62\").    Weight as of this encounter: 90.7 kg (200 lb).     Physical Exam   Constitutional: She appears well-developed and well-nourished.   HENT:   Head: Normocephalic and atraumatic.   Eyes: Conjunctivae are normal.   Neck: Neck normal appearance.  Pulmonary/Chest: Effort normal.   Neurological: She is alert.   Psychiatric: She has a normal mood and affect.     Result Review :                   Assessment and Plan      Diagnoses and all orders for this visit:    1. Postpartum anxiety (Primary)  -     escitalopram (Lexapro) 5 MG tablet; Take 1 tablet by mouth Daily.  Dispense: 90 tablet; Refill: 0  -     busPIRone (BUSPAR) 5 MG tablet; Take 1 tablet by mouth 3 (Three) Times a Day As Needed (anxiety).  Dispense: 270 tablet; Refill: 0    Start lexapro and buspar. Counseled risks benefits side effects. Follow up 6 weeks     Follow Up     Return in about 6 weeks (around 9/9/2025) for Recheck.  Patient was given instructions and counseling regarding her condition or for health maintenance advice. Please see specific information pulled into the AVS if appropriate.     Mode of Visit: Video  Location of patient: home  Location of provider: [facility]  You have chosen to receive care through a telehealth visit.  The patient " has signed the video visit consent form.  The visit included audio and video interaction. No technical issues occurred during this visit.

## 2025-08-22 ENCOUNTER — TELEMEDICINE (OUTPATIENT)
Dept: FAMILY MEDICINE CLINIC | Facility: TELEHEALTH | Age: 25
End: 2025-08-22
Payer: COMMERCIAL

## 2025-08-22 DIAGNOSIS — J01.00 ACUTE NON-RECURRENT MAXILLARY SINUSITIS: Primary | ICD-10-CM

## 2025-08-22 PROCEDURE — 99213 OFFICE O/P EST LOW 20 MIN: CPT | Performed by: NURSE PRACTITIONER

## 2025-08-22 RX ORDER — PREDNISONE 10 MG/1
TABLET ORAL
Qty: 21 TABLET | Refills: 0 | Status: SHIPPED | OUTPATIENT
Start: 2025-08-22

## 2025-08-22 RX ORDER — AMOXICILLIN 875 MG/1
875 TABLET, COATED ORAL 2 TIMES DAILY
Qty: 20 TABLET | Refills: 0 | Status: SHIPPED | OUTPATIENT
Start: 2025-08-22 | End: 2025-09-01